# Patient Record
Sex: MALE | Race: WHITE | NOT HISPANIC OR LATINO | Employment: OTHER | URBAN - METROPOLITAN AREA
[De-identification: names, ages, dates, MRNs, and addresses within clinical notes are randomized per-mention and may not be internally consistent; named-entity substitution may affect disease eponyms.]

---

## 2024-08-05 PROCEDURE — 99284 EMERGENCY DEPT VISIT MOD MDM: CPT

## 2024-08-06 ENCOUNTER — HOSPITAL ENCOUNTER (EMERGENCY)
Facility: HOSPITAL | Age: 49
Discharge: HOME | End: 2024-08-06
Attending: EMERGENCY MEDICINE | Admitting: EMERGENCY MEDICINE
Payer: COMMERCIAL

## 2024-08-06 ENCOUNTER — APPOINTMENT (OUTPATIENT)
Dept: CT IMAGING | Facility: HOSPITAL | Age: 49
End: 2024-08-06
Payer: COMMERCIAL

## 2024-08-06 VITALS
HEIGHT: 67 IN | HEART RATE: 94 BPM | TEMPERATURE: 97.5 F | SYSTOLIC BLOOD PRESSURE: 126 MMHG | DIASTOLIC BLOOD PRESSURE: 86 MMHG | BODY MASS INDEX: 23.39 KG/M2 | RESPIRATION RATE: 18 BRPM | WEIGHT: 149 LBS | OXYGEN SATURATION: 100 %

## 2024-08-06 DIAGNOSIS — R59.0 CERVICAL ADENOPATHY: ICD-10-CM

## 2024-08-06 DIAGNOSIS — K14.8 TONGUE LESION: ICD-10-CM

## 2024-08-06 DIAGNOSIS — M54.16 LUMBAR RADICULOPATHY: Primary | ICD-10-CM

## 2024-08-06 DIAGNOSIS — B20 HIV INFECTION, UNSPECIFIED SYMPTOM STATUS: ICD-10-CM

## 2024-08-06 PROCEDURE — 72131 CT LUMBAR SPINE W/O DYE: CPT

## 2024-08-06 RX ORDER — VALBENAZINE 80 MG/1
80 CAPSULE ORAL ONCE
COMMUNITY
End: 2024-08-13 | Stop reason: SDUPTHER

## 2024-08-06 RX ORDER — DORAVIRINE 100 MG/1
100 TABLET, FILM COATED ORAL ONCE
COMMUNITY

## 2024-08-06 RX ORDER — SULFAMETHOXAZOLE AND TRIMETHOPRIM 800; 160 MG/1; MG/1
1 TABLET ORAL ONCE
COMMUNITY

## 2024-08-06 RX ORDER — HYDROCODONE BITARTRATE AND ACETAMINOPHEN 5; 325 MG/1; MG/1
1 TABLET ORAL EVERY 6 HOURS PRN
Qty: 8 TABLET | Refills: 0 | Status: SHIPPED | OUTPATIENT
Start: 2024-08-06 | End: 2024-08-13

## 2024-08-06 RX ORDER — HYDROCODONE BITARTRATE AND ACETAMINOPHEN 5; 325 MG/1; MG/1
1 TABLET ORAL ONCE
Status: COMPLETED | OUTPATIENT
Start: 2024-08-06 | End: 2024-08-06

## 2024-08-06 RX ORDER — MELOXICAM 7.5 MG/1
7.5 TABLET ORAL DAILY
Qty: 30 TABLET | Refills: 0 | Status: SHIPPED | OUTPATIENT
Start: 2024-08-06 | End: 2024-09-05

## 2024-08-06 RX ORDER — DARUNAVIR ETHANOLATE AND COBICISTAT 800; 150 MG/1; MG/1
1 TABLET, FILM COATED ORAL DAILY
COMMUNITY

## 2024-08-06 RX ORDER — DOLUTEGRAVIR SODIUM 50 MG/1
50 TABLET, FILM COATED ORAL DAILY
COMMUNITY

## 2024-08-06 RX ADMIN — HYDROCODONE BITARTRATE AND ACETAMINOPHEN 1 TABLET: 5; 325 TABLET ORAL at 03:56

## 2024-08-06 NOTE — ED PROVIDER NOTES
EMERGENCY DEPARTMENT ENCOUNTER  Room Number:  13/13  PCP: Provider, No Known  Independent Historians: Patient      HPI:  Chief Complaint: had concerns including Neck Pain and Nausea.  Back pain    A complete HPI/ROS/PMH/PSH/SH/FH are unobtainable due to: Nothing      Context: Kareem Cruz is a 49 y.o. male with a medical history of HIV/AIDS who presents to the ED c/o acute tongue lesion and left low back pain.  He states that the tongue lesion has been present for a while now.  It started as a small bump and has now progressed to become a little bit larger.  He has had associated lymph node swelling on the left side of his neck.  He states that in 2019 he had a small lesion on his tongue removed that he believes was just HPV.  He has had some associated dysphagia with solids secondary to pain.    He also reports that he has had progressive pain in his left lower back for about 1 month now.  He states that it is difficult for him to lift his left leg secondary to pain.  He denies any bowel or bladder incontinence.  No tingling, numbness, weakness in his lower extremities.    He does have a history of HIV and remains on antiretroviral therapy.  He states his most recent CD4 count was about 160.  [facility].  He has an appointment this Thursday for blood work.      Review of prior external notes (non-ED) -and- Review of prior external test results outside of this encounter: See ED course below for summary of prior outside hospital ED visit.        PAST MEDICAL HISTORY  Active Ambulatory Problems     Diagnosis Date Noted    No Active Ambulatory Problems     Resolved Ambulatory Problems     Diagnosis Date Noted    No Resolved Ambulatory Problems     Past Medical History:   Diagnosis Date    AIDS 2004    HIV (human immunodeficiency virus infection) 1992    Whooping cough 08/2015         PAST SURGICAL HISTORY  Past Surgical History:   Procedure Laterality Date    ABDOMINAL HERNIA REPAIR  2020     ABDOMINAL WALL SURGERY Bilateral 10/30/2022    Reconstructive surgery    ANKLE SURGERY Right 09/09/2009    COLON SURGERY Left 12/24/2014    Partial    HERNIA REPAIR  11/2015    HERNIA REPAIR  2016    HERNIA REPAIR  2017    HERNIA REPAIR  2019    HERNIA REPAIR Bilateral 10/30/2022    PANNICULECTOMY Bilateral 10/30/2022         FAMILY HISTORY  History reviewed. No pertinent family history.      SOCIAL HISTORY  Social History     Socioeconomic History    Marital status:    Tobacco Use    Smoking status: Former     Types: Cigarettes    Smokeless tobacco: Never   Vaping Use    Vaping status: Never Used   Substance and Sexual Activity    Alcohol use: Not Currently    Drug use: Not Currently    Sexual activity: Yes     Partners: Male         ALLERGIES  Gabapentin and Penicillins      REVIEW OF SYSTEMS  Review of all 14 systems is negative other than stated in the HPI above.      PHYSICAL EXAM    I have reviewed the triage vital signs and nursing notes.    ED Triage Vitals   Temp Heart Rate Resp BP SpO2   08/05/24 2324 08/05/24 2324 08/05/24 2324 08/05/24 2330 08/05/24 2324   (S) 97.5 °F (36.4 °C) 99 18 131/77 99 %      Temp src Heart Rate Source Patient Position BP Location FiO2 (%)   08/05/24 2324 08/05/24 2324 -- -- --   Tympanic Monitor            GENERAL: awake and alert, well-appearing, no acute distress  HENT: Normocephalic.  There is a shallow white/yellow ulcer on the surface of the tongue approximately 5 mm in width.  The oropharynx is otherwise unremarkable.  Single left anterior cervical lymph node palpable measuring approximately 2 cm.  EYES: no scleral icterus, EOMI  CV: regular rhythm, regular rate  RESPIRATORY: normal effort  ABDOMEN: soft, nondistended, nontender throughout  MUSCULOSKELETAL: no deformity.  There is no midline L-spine tenderness.  There is mild point tenderness over the left sacroiliac joint.  Negative straight leg raise right lower extremity, positive left straight leg raise at 20  degrees.  NEURO: alert, moves all extremities, follows commands, cranial nerves II through XII grossly intact, speech clear.  Normal strength and sensation throughout bilateral lower extremities.  He does have pain with left hip flexion beyond 20 degrees.  PSYCH: calm, cooperative  SKIN: Warm, dry, normal to inspection, no rash          LAB RESULTS  No results found for this or any previous visit (from the past 24 hour(s)).    The above labs were ordered by me and independently reviewed by me.     RADIOLOGY  CT Lumbar Spine Without Contrast    Result Date: 8/6/2024  CT OF THE LUMBAR SPINE  HISTORY: Low back pain with radiation to the left hip  COMPARISON: None available.  TECHNIQUE: Axial CT imaging was obtained through the lumbar spine. Coronal and sagittal reformatted images were obtained.  FINDINGS: No acute fracture or subluxation of the lumbar spine is seen. Lumbar vertebral body alignment appears within normal limits. Vertebral disc spaces appear well-maintained. Review of soft tissues demonstrates postsurgical changes involving the stomach. There are some iliac calcifications. There are postsurgical changes involving the sigmoid  T12-L1: There is no canal stenosis or neuroforaminal narrowing. L1-L2: There is no canal stenosis or neuroforaminal narrowing. L2-L3: There is mild disc bulge, without significant canal stenosis or neuroforaminal narrowing. L3-L4: There is diffuse disc bulge. There is no significant canal stenosis, but there is at least moderate neuroforaminal narrowing on the left. L4-L5: There is diffuse disc bulge. There is mild canal stenosis. There is a least moderate neuroforaminal narrowing bilaterally, although more significant on the left. L5-S1: There is disc bulge, without canal stenosis or neuroforaminal narrowing.      Degenerative changes, as noted above.  Radiation dose reduction techniques were utilized, including automated exposure control and exposure modulation based on body size.    This report was finalized on 8/6/2024 5:19 AM by Dr. Yuliet Joel M.D on Workstation: [id number]       The above radiology studies were ordered by me.  See ED course for independent interpretations.     MEDICATIONS GIVEN IN ER  Medications   HYDROcodone-acetaminophen (NORCO) 5-325 MG per tablet 1 tablet (1 tablet Oral Given 8/6/24 0356)         ORDERS PLACED DURING THIS VISIT:  Orders Placed This Encounter   Procedures    CT Lumbar Spine Without Contrast         OUTPATIENT MEDICATION MANAGEMENT:  No current Epic-ordered facility-administered medications on file.     Current Outpatient Medications Ordered in Epic   Medication Sig Dispense Refill    Darunavir-Cobicistat (Prezcobix) 800-150 MG per tablet Take 1 tablet by mouth Daily.      dolutegravir (Tivicay) 50 MG tablet Take 1 tablet by mouth Daily.      Doravirine (Pifeltro) 100 MG tablet Take 100 mg by mouth 1 (One) Time.      HYDROcodone-acetaminophen (NORCO) 5-325 MG per tablet Take 1 tablet by mouth Every 6 (Six) Hours As Needed for Moderate Pain. 8 tablet 0    meloxicam (MOBIC) 7.5 MG tablet Take 1 tablet by mouth Daily for 30 days. 30 tablet 0    sulfamethoxazole-trimethoprim (Bactrim DS) 800-160 MG per tablet Take 1 tablet by mouth 1 (One) Time.      Valbenazine Tosylate (Ingrezza) 80 MG capsule Take 80 mg by mouth 1 (One) Time.           PROCEDURES  Procedures            PROGRESS, DATA ANALYSIS, CONSULTS, AND MEDICAL DECISION MAKING  All labs have been independently interpreted by me.  All radiology studies have been reviewed by me. All EKG's have been independently viewed and interpreted by me.  Discussion below represents my analysis of pertinent findings related to patient's condition, differential diagnosis, treatment plan and final disposition.    Differential diagnosis includes but is not limited to:  Lumbar radiculopathy  Sacroiliitis  Osteomyelitis  Spinal epidural abscess  Spinal epidural hematoma        Clinical Scores:                   ED Course as of 08/06/24 0709   Tue Aug 06, 2024   0415 I reviewed outside hospital ED visit from 1/26/2024 patient was complaining of left bicep pain. [JR]   0538 CT lumbar spine independently interpreted in PACS.  This is remarkable for diffuse disc bulge at L3-4 and L4-5 with at least mild to moderate neuroforaminal narrowing on the left.  This is potentially contributing to patient's current symptoms. [JR]   0538 Patient had concerns regarding a lesion on his tongue as well as some left cervical adenopathy, in addition to his acute lower back pain.  I explained that in regards to the tongue lesion, he will need to follow-up as outpatient with ENT or surgery for consideration of biopsy.  I offered to obtain basic labs as he was concerned about potential for lymphoma.  He then stated that he has labs scheduled with his infectious disease doctor in 2 days and he would prefer to wait until then to get labs.  I think this is reasonable.  The CT L-spine does suggest likely lumbar radiculopathy contributing to his left lower back pain with radiation to the left hip region.  He does not have any neurologic deficit to warrant emergent MRI.  I do not think that he is a good candidate for steroids given his chronic immunocompromise.  I will provide him with a short course of narcotic analgesics, lidocaine patches, anti-inflammatories.  He will be referred to neurosurgery spine for further evaluation possible referral for lumbar epidural. [JR]   0559 Patient reassessed and informed of imaging findings, plan for trial of meloxicam as well as a 2-day course of hydrocodone for pain.  He has tolerated meloxicam well previously on his current ART therapy.  He was advised to follow-up with ENT or surgery regarding his tongue lesion and cervical adenopathy, as well as his infectious disease provider. [JR]      ED Course User Index  [JR] Rolando Mitchell MD             AS OF 07:09 EDT VITALS:    BP - 126/86  HR -  94  TEMP - (S) 97.5 °F (36.4 °C) (Tympanic)  O2 SATS - 100%    COMPLEXITY OF CARE        Chronic or social conditions impacting patient care (Social Determinants of Health):     DIAGNOSIS  Final diagnoses:   Lumbar radiculopathy   Tongue lesion   Cervical adenopathy   HIV infection, unspecified symptom status           DISPOSITION  DISCHARGE    Patient discharged in stable condition.    Reviewed implications of results, diagnosis, meds, responsibility to follow up, warning signs and symptoms of possible worsening, potential complications and reasons to return to ER.    Patient/Family voiced understanding of above instructions.    Discussed plan for discharge, as there is no emergent indication for admission. Patient referred to primary care provider for BP management due to today's BP. Pt/family is agreeable and understands need for follow up and repeat testing.  Pt is aware that discharge does not mean that nothing is wrong but it indicates no emergency is present that requires admission and they must continue care with follow-up as given below or physician of their choice.     FOLLOW-UP  Kristian Pate MD  [address]  [address]  [place] [id number]  [phone]    Schedule an appointment as soon as possible for a visit       Joaquin Schmitt MD  [address]  [address]  [facility] [address]  [phone]    Schedule an appointment as soon as possible for a visit       Luther Sierra MD  [address]  [facility] [address]  [phone]    Schedule an appointment as soon as possible for a visit       [facility]  [address] [place] [address]  [phone]  Schedule an appointment as soon as possible for a visit            Medication List        New Prescriptions      HYDROcodone-acetaminophen 5-325 MG per tablet  Commonly known as: NORCO  Take 1 tablet by mouth Every 6 (Six) Hours As Needed for Moderate Pain.     meloxicam 7.5 MG  tablet  Commonly known as: MOBIC  Take 1 tablet by mouth Daily for 30 days.               Where to Get Your Medications        These medications were sent to [facility]/pharmacy #[id number] - [place], [place] - [address]. AT [facility] - [phone]  - [phone]   [address] [facility] [address]      Phone: [phone]   HYDROcodone-acetaminophen 5-325 MG per tablet  meloxicam 7.5 MG tablet             Prescription drug monitoring program review:           Please note that portions of this document were completed with a voice recognition program.    Note Disclaimer: At [facility], we believe that sharing information builds trust and better relationships. You are receiving this note because you recently visited [facility]. It is possible you will see health information before a provider has talked with you about it. This kind of information can be easy to misunderstand. To help you fully understand what it means for your health, we urge you to discuss this note with your provider.         Rolando Mitchell MD  08/06/24 8529

## 2024-08-06 NOTE — ED NOTES
"Pt reports he has had neck pain for one month d/t a swollen lymph node on the left side. Pt reports he also has lower back pain that also started 1 month ago that radiates to his left hip. Pt states he has \"had HPV and had a spot removed from the center of his tongue 12/9/2019, and that there is now a grey thing sticking out of it.\" Pt also states he has abdominal pain on the left side near his navel. Pt c/o intermittent nausea x 5 days.  "

## 2024-08-13 ENCOUNTER — OFFICE VISIT (OUTPATIENT)
Dept: FAMILY MEDICINE CLINIC | Facility: CLINIC | Age: 49
End: 2024-08-13
Payer: COMMERCIAL

## 2024-08-13 VITALS
HEIGHT: 67 IN | OXYGEN SATURATION: 99 % | HEART RATE: 94 BPM | SYSTOLIC BLOOD PRESSURE: 114 MMHG | BODY MASS INDEX: 25.74 KG/M2 | WEIGHT: 164 LBS | DIASTOLIC BLOOD PRESSURE: 76 MMHG

## 2024-08-13 DIAGNOSIS — D64.9 ANEMIA, UNSPECIFIED TYPE: ICD-10-CM

## 2024-08-13 DIAGNOSIS — G24.01 TARDIVE DYSKINESIA: ICD-10-CM

## 2024-08-13 DIAGNOSIS — Z12.5 SCREENING FOR MALIGNANT NEOPLASM OF PROSTATE: ICD-10-CM

## 2024-08-13 DIAGNOSIS — E29.1 HYPOGONADISM IN MALE: ICD-10-CM

## 2024-08-13 DIAGNOSIS — Z12.11 SCREEN FOR COLON CANCER: ICD-10-CM

## 2024-08-13 DIAGNOSIS — L57.0 ACTINIC KERATOSIS: ICD-10-CM

## 2024-08-13 DIAGNOSIS — Z87.891 HISTORY OF TOBACCO USE: ICD-10-CM

## 2024-08-13 DIAGNOSIS — K63.5 HYPERPLASTIC COLONIC POLYP, UNSPECIFIED PART OF COLON: ICD-10-CM

## 2024-08-13 DIAGNOSIS — A53.9 SYPHILIS: Primary | ICD-10-CM

## 2024-08-13 DIAGNOSIS — Z00.00 ANNUAL PHYSICAL EXAM: ICD-10-CM

## 2024-08-13 DIAGNOSIS — M05.80 POLYARTHRITIS WITH POSITIVE RHEUMATOID FACTOR: ICD-10-CM

## 2024-08-13 DIAGNOSIS — Z88.0 PENICILLIN ALLERGY: ICD-10-CM

## 2024-08-13 PROBLEM — F10.11 HISTORY OF ALCOHOL ABUSE: Status: ACTIVE | Noted: 2024-08-13

## 2024-08-13 PROBLEM — I73.00 RAYNAUD'S PHENOMENON: Status: ACTIVE | Noted: 2024-08-13

## 2024-08-13 PROBLEM — F43.10 PTSD (POST-TRAUMATIC STRESS DISORDER): Status: ACTIVE | Noted: 2024-08-13

## 2024-08-13 PROBLEM — F41.9 ANXIETY AND DEPRESSION: Status: ACTIVE | Noted: 2024-08-13

## 2024-08-13 PROBLEM — M19.90 ARTHRITIS: Status: ACTIVE | Noted: 2024-08-13

## 2024-08-13 PROBLEM — Z21 HUMAN IMMUNODEFICIENCY VIRUS INFECTION: Status: ACTIVE | Noted: 2023-09-15

## 2024-08-13 PROBLEM — F19.11 HISTORY OF SUBSTANCE ABUSE: Status: ACTIVE | Noted: 2024-08-13

## 2024-08-13 PROBLEM — H91.93 BILATERAL HEARING LOSS: Status: ACTIVE | Noted: 2024-08-13

## 2024-08-13 PROBLEM — F32.A ANXIETY AND DEPRESSION: Status: ACTIVE | Noted: 2024-08-13

## 2024-08-13 PROBLEM — B20 HUMAN IMMUNODEFICIENCY VIRUS INFECTION: Status: ACTIVE | Noted: 2023-09-15

## 2024-08-13 PROCEDURE — 99386 PREV VISIT NEW AGE 40-64: CPT | Performed by: FAMILY MEDICINE

## 2024-08-13 PROCEDURE — 99215 OFFICE O/P EST HI 40 MIN: CPT | Performed by: FAMILY MEDICINE

## 2024-08-13 RX ORDER — DOXYCYCLINE HYCLATE 100 MG/1
100 CAPSULE ORAL 2 TIMES DAILY
Qty: 28 CAPSULE | Refills: 0 | Status: SHIPPED | OUTPATIENT
Start: 2024-08-13 | End: 2024-08-27

## 2024-08-13 RX ORDER — TESTOSTERONE 16.2 MG/G
1 GEL TRANSDERMAL DAILY
Qty: 75 G | Refills: 1 | Status: SHIPPED | OUTPATIENT
Start: 2024-08-13

## 2024-08-13 RX ORDER — ZINC SULFATE 50(220)MG
CAPSULE ORAL DAILY
COMMUNITY

## 2024-08-13 RX ORDER — MULTIVIT-MIN/IRON FUM/FOLIC AC 7.5 MG-4
1 TABLET ORAL DAILY
COMMUNITY

## 2024-08-13 RX ORDER — ESCITALOPRAM OXALATE 20 MG/1
20 TABLET ORAL DAILY
COMMUNITY

## 2024-08-13 RX ORDER — VALBENAZINE 80 MG/1
80 CAPSULE ORAL ONCE
Qty: 30 CAPSULE | Refills: 1 | Status: SHIPPED | OUTPATIENT
Start: 2024-08-13 | End: 2024-08-13

## 2024-08-13 NOTE — PATIENT INSTRUCTIONS
Today: Update screening labs- schedule AM lab draw    Meds: Reorder Ingrezza- sent to specialty pharmacy  Doxycyline 100mg 2x daily for 2 weeks- syphillis  Testosterone gel daily- low T    Referrals: Derm, colonoscopy, allergy, screening chest CT (lung cancer)- you should receive a call within 1 week to schedule the appointment.  If you do not hear anything please let us know.    Healthy lifestyle tips  - Reduce intake of processed food (shop perimeter of grocery store), especially white flour and sugar  - Increase intake of fruits/veggies  - Drink 32-64oz of water a day  - Quit smoking if you smoke  - Drink no more than 2 alcoholic beverages/day if you are male, no more than 1 alcoholic beverage/day if you are female  - Get 6-8 hours of restful sleep at night- poor sleep quality has been linked to increased risk of cardiovascular disease  - Voluntary physical activity cumulative 120-150 minutes/week  - Stress management utilizing meditation and mindfulness (InsightTimer, free aspen)  - Keep blood pressure < 140/90    Heart healthy diet from AHA: https://www.heart.org/en/healthy-living/healthy-eating/eat-smart/nutrition-basics/aha-diet-and-lifestyle-recommendations

## 2024-08-13 NOTE — PROGRESS NOTES
Chief Complaint  Chief Complaint   Patient presents with    Establish Care       Subjective    History of Present Illness  Kareem Cruz is a 49 y.o. male presents to [facility] to establish care.    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 He used to be morbidly obese. He managed to get off of metformin and lost 212 pounds. He follows a vegan diet. He avoids processed foods. He maintains an active lifestyle, walking his dog for at least an hour daily. He has Raynaud's phenomenon and arthritis in his back, neck, and hands, which limits his weightlifting activities. He sleeps for about 5 hours a night, which is an improvement from his previous sleep apnea due to weight loss. He has anxiety, PTSD, and depression, which occasionally disrupt his sleep. He has a prescription for trazodone, but prefers not to take it due to side effects. He is practicing good sleep hygiene.     His health goals are to lose weight. He reports experiencing concerning symptoms, including an unusual rash and enlarged lymph nodes on the back of his arms. His recent blood work, ordered at [facility], revealed positive syphilis.  He requests antibiotic treatment.  He was previously taking Xyosted testosterone every Friday, but discontinued by previous physician.  He also notes difficulties in obtaining Ingrezza for tardive dyskinesia.  He has hearing loss and uses hearing aids since 2016. He is scheduled to see an ENT specialist tomorrow.  He underwent allergy testing 2 years ago. His last colonoscopy was in 2022. He underwent colorectal surgery in 1995 and 2004, with laser surgery twice for condyloma, polyps, and squamous cell carcinoma. He is sexually active with his  and male partners, they are not in a monogamous relationship. He has received the hepatitis B vaccine series 6 times and  is a not responder. He had a positive rheumatoid factor in 2017 and MGUS in 2017. He has seen an eye doctor in the last 12 months. He has not seen a dentist in the last 12 months.  He has HIV, currently managed by the [facility] clinic with antiretroviral drugs; he takes Bactrim DS daily.    SOCIAL HISTORY  He quit smoking in 2015. He started smoking a pack a day off and on since 1992.  He has a history of alcohol abuse, he drank 7 gallons of vodka in 12/2020. He went on naltrexone therapy and has been sober since. He has a history of substance use. His last use was in 1995.    FAMILY HISTORY  His oldest brother has Meniere's syndrome.  He has a strong family history of alcohol, cigarette, and drug use.  There is family history of liver disease, heart disease, stroke.    ALLERGIES  He developed an allergy to penicillin when he was 1 or 1, he has not been exposed to it since then.  He has not been tested for penicillin allergy.    Current Outpatient Medications on File Prior to Visit   Medication Sig Dispense Refill    Darunavir-Cobicistat (Prezcobix) 800-150 MG per tablet Take 1 tablet by mouth Daily.      dolutegravir (Tivicay) 50 MG tablet Take 1 tablet by mouth Daily.      Doravirine (Pifeltro) 100 MG tablet Take 100 mg by mouth 1 (One) Time.      escitalopram (LEXAPRO) 20 MG tablet Take 1 tablet by mouth Daily.      meloxicam (MOBIC) 7.5 MG tablet Take 1 tablet by mouth Daily for 30 days. 30 tablet 0    multivitamin with minerals tablet tablet Take 1 tablet by mouth Daily.      sulfamethoxazole-trimethoprim (Bactrim DS) 800-160 MG per tablet Take 1 tablet by mouth 1 (One) Time.      zinc sulfate (ZINCATE) 220 (50 Zn) MG capsule Take  by mouth Daily.      [DISCONTINUED] HYDROcodone-acetaminophen (NORCO) 5-325 MG per tablet Take 1 tablet by mouth Every 6 (Six) Hours As Needed for Moderate Pain. (Patient not taking: Reported on 8/13/2024) 8 tablet 0    [DISCONTINUED] Valbenazine Tosylate (Ingrezza) 80 MG capsule Take 80  mg by mouth 1 (One) Time. (Patient not taking: Reported on 8/13/2024)       No current facility-administered medications on file prior to visit.       Patient Active Problem List   Diagnosis    Human immunodeficiency virus infection    Tardive dyskinesia    Raynaud's phenomenon    Arthritis    Bilateral hearing loss    Hyperplastic colonic polyp    History of alcohol abuse    History of substance abuse    Polyarthritis with positive rheumatoid factor    Penicillin allergy    Anxiety and depression    PTSD (post-traumatic stress disorder)    Syphilis    Hypogonadism in male    Actinic keratosis    History of tobacco use       Past Medical History:   Diagnosis Date    AIDS 2004    Anxiety 1988    Escitalopram    Arthritis 2004    Depression 1987    Diverticulosis 2013    Colon resection    Erectile dysfunction 2004    HIV (human immunodeficiency virus infection) 1992    HL (hearing loss) 2001    Low back pain 2021    Obesity 1991    Substance abuse 2019    Naltrexone (alcohol)    Visual impairment Prism    Head inhjury in Jan 2921    Whooping cough 08/2015       Past Surgical History:   Procedure Laterality Date    ABDOMINAL HERNIA REPAIR  2020    ABDOMINAL WALL SURGERY Bilateral 10/30/2022    Reconstructive surgery    ANKLE SURGERY Right 09/09/2009    BARIATRIC SURGERY  11/2009    COLON SURGERY Left 12/24/2014    Partial    COLONOSCOPY  11/2022    HERNIA REPAIR  11/2015    HERNIA REPAIR  2016    HERNIA REPAIR  2017    HERNIA REPAIR  2019    HERNIA REPAIR Bilateral 10/30/2022    PANNICULECTOMY Bilateral 10/30/2022    SEPTOPLASTY  06/2016    SINUS SURGERY  06/2016    SPINE SURGERY  3/2020    Rhizotomy       Family History   Problem Relation Age of Onset    Alcohol abuse Mother     Anxiety disorder Mother     Arthritis Mother     COPD Mother     Heart disease Mother     Hyperlipidemia Mother     Stroke Mother     Thyroid disease Mother     Alcohol abuse Father     Arthritis Father     Heart disease Father     Stroke  Father     Birth defects Sister         Everyone in my generation has some Sort of birth defect    Depression Sister     Depression Brother     Drug abuse Brother     Hearing loss Brother     Mental illness Brother     Mental illness Brother     Vision loss Paternal Uncle         Usher syndrome       Health Maintenance Summary            Ordered - COLORECTAL CANCER SCREENING (View Topic Details) Ordered on 8/13/2024      No completion, postpone, or frequency change history exists for this topic.              Postponed - COVID-19 Vaccine (2 - Pfizer risk series) Postponed until 11/2/2024 08/13/2024  Postponed until 11/2/2024 by Meenu Reed MD (Product Unavailable)    09/27/2021  Imm Admin: COVID-19 (PFIZER) Purple Cap Monovalent              Postponed - INFLUENZA VACCINE (Yearly - August to March) Postponed until 3/31/2025      08/13/2024  Postponed until 3/31/2025 by Meenu Reed MD (Product Unavailable)    09/20/2023  Imm Admin: Fluzone High-Dose 65+yrs    09/27/2021  Imm Admin: Influenza Seasonal Injectable    08/07/2015  Imm Admin: Influenza Seasonal Injectable              Postponed - Hepatitis B (1 of 3 - 19+ 3-dose series) Postponed until 12/31/2025 08/13/2024  Postponed until 12/31/2025 by Meenu Reed MD (Patient Refused - recieved previously- nonresponder)              TDAP/TD VACCINES (2 - Td or Tdap) Next due on 2/24/2025 02/24/2015  Imm Admin: Tdap              ANNUAL PHYSICAL (Yearly) Next due on 8/13/2025 08/13/2024  Done              BMI FOLLOWUP (Yearly) Next due on 8/13/2025 08/13/2024  SmartData: BMI EDUCATION FOR OVERWEIGHT              Pneumococcal Vaccine 0-64 (3 of 3 - PPSV23 or PCV20) Next due on 10/16/2025      10/16/2020  Imm Admin: Pneumococcal Polysaccharide (PPSV23)    02/27/2013  Imm Admin: Pneumococcal Conjugate 13-Valent (PCV13)              HEPATITIS C SCREENING  Completed      09/15/2023  Patient-Reported (Performed Externally)              ZOSTER  "VACCINE (Series Information) Completed      12/05/2023  Imm Admin: Shingrix    09/20/2023  Imm Admin: Dominik                       Objective   Vitals:    08/13/24 1309   BP: 114/76   Pulse: 94   SpO2: 99%   Weight: 74.4 kg (164 lb)   Height: 170.2 cm (67.01\")        Physical Exam  Vitals reviewed.   Constitutional:       General: He is not in acute distress.     Appearance: Normal appearance.   HENT:      Head: Normocephalic and atraumatic.      Right Ear: Tympanic membrane, ear canal and external ear normal.      Left Ear: Tympanic membrane, ear canal and external ear normal.      Nose: Nose normal. No rhinorrhea.      Mouth/Throat:      Mouth: Mucous membranes are moist.      Pharynx: No posterior oropharyngeal erythema.   Eyes:      Extraocular Movements: Extraocular movements intact.      Conjunctiva/sclera: Conjunctivae normal.      Pupils: Pupils are equal, round, and reactive to light.   Neck:      Comments: No thyromegaly or thyroid nodule on palpation  Cardiovascular:      Rate and Rhythm: Normal rate and regular rhythm.      Pulses: Normal pulses.      Heart sounds: Normal heart sounds. No murmur heard.  Pulmonary:      Effort: Pulmonary effort is normal.      Breath sounds: Normal breath sounds. No wheezing.   Abdominal:      General: Bowel sounds are normal. There is no distension.      Palpations: Abdomen is soft.      Tenderness: There is no abdominal tenderness.   Musculoskeletal:      Cervical back: Normal range of motion and neck supple.      Comments: Left wrist swelling   Lymphadenopathy:      Cervical: Cervical adenopathy (Tender anterior cervical lymphadenopathy) present.   Skin:     General: Skin is warm and dry.      Capillary Refill: Capillary refill takes less than 2 seconds.      Findings: No lesion or rash.   Neurological:      General: No focal deficit present.      Mental Status: He is alert and oriented to person, place, and time.      Gait: Gait normal.      Deep Tendon Reflexes: " Reflexes normal.   Psychiatric:         Mood and Affect: Mood normal.         Behavior: Behavior normal.         Judgment: Judgment normal.          PHQ-9 Depression Screening  Little interest or pleasure in doing things? 0-->not at all   Feeling down, depressed, or hopeless? 1-->several days   Trouble falling or staying asleep, or sleeping too much?     Feeling tired or having little energy?     Poor appetite or overeating?     Feeling bad about yourself - or that you are a failure or have let yourself or your family down?     Trouble concentrating on things, such as reading the newspaper or watching television?     Moving or speaking so slowly that other people could have noticed? Or the opposite - being so fidgety or restless that you have been moving around a lot more than usual?     Thoughts that you would be better off dead, or of hurting yourself in some way?     PHQ-9 Total Score 1   If you checked off any problems, how difficult have these problems made it for you to do your work, take care of things at home, or get along with other people?       The following data was reviewed by: Meenu Reed MD on 08/13/2024:  [facility], Care Everywhere: CBC, CMP, HIV, lipid, hep A, hep B, hep C, syphilis, gonorrhea/chlamydia  [facility] clinic note      Assessment and Plan  Kareem Cruz is a 49 y.o. male presents to [facility] today to establish care and discuss health goals/concerns, chart reviewed and updated, medications reviewed, labs reviewed, preventative med reviewed. Patient has not been seen by primary care for annual exam in the last 12 months.. Answered all questions at this time, pt expressed no further concerns today.     Preventative medicine:   Eye exam: Not up to date.  Encouraged annual optometry exam  Dental exam: Up to date.    BP: normal  Lipid Panel :  normal   A1c:  ordered    Hep C screening: Negative  Colon cancer screening UTD- age 45-75: No-referral for colonoscopy  Lung  cancer screening UTD- age 50-75: No-referral for low-dose chest CT  Immunizations UTD: No; patient previously received hep B vaccine but is not a responder and declines further vaccination  Anxiety/depression screening: normal  Tobacco cessation counseling: N/A    Men:   Aortic aneurysm screen UTD age 65: Will need  PSA UTD- age 50-75: Ordered    Assessment & Plan  1. Syphilis.  Given history of penicillin allergy, will treat with doxycycline 100 mg twice daily x 14 days.  Advised to not take at the same time as Bactrim due to medication interaction.      2.  Tardive dyskinesia  Ingrezza prescription sent to specialty pharmacy    3 hypogonadism.  A.m. testosterone pending, will refill testosterone gel.    4 penicillin allergy.  Referral to allergist for confirmatory testing    5 history of polyarthritis with positive RF  Autoimmune labs pending, unable to send to rheumatology locally without positive labs.    6 actinic keratosis  Referral to dermatology    7 history of colon polyp  Referral for colonoscopy    8.  Health maintenance.  Screening labs will be updated today.  Referrals for dermatology, colonoscopy, allergy, and screening chest CT have been made. The patient will be contacted within week to schedule these procedures. The patient is advised to maintain a healthy lifestyle, reduce white flour and sugar intake, maintain adequate hydration, abstain from smoking and drinking, and get 6 to 8 hours of restful sleep. Regular exercise and stress management are also advised. Blood pressure should be less than 140/90.    Follow-up  A follow-up visit is scheduled for 3 months from now.    Diagnoses and all orders for this visit:    1. Syphilis (Primary)  -     doxycycline (VIBRAMYCIN) 100 MG capsule; Take 1 capsule by mouth 2 (Two) Times a Day for 14 days.  Dispense: 28 capsule; Refill: 0    2. Tardive dyskinesia  -     Valbenazine Tosylate (Ingrezza) 80 MG capsule; Take 80 mg by mouth 1 (One) Time for 1 dose.  Indications: Tardive Dyskinesia  Dispense: 30 capsule; Refill: 1    3. Hypogonadism in male  -     Testosterone 20.25 MG/ACT (1.62%) gel; Place 1 g on the skin as directed by provider Daily. Indications: Deficient Activity of the Testis  Dispense: 75 g; Refill: 1  -     Testosterone (Free & Total), LC / MS; Future  -     Hemoglobin A1c; Future    4. Penicillin allergy  -     Ambulatory Referral to Allergy    5. Polyarthritis with positive rheumatoid factor  Overview:  2017    Orders:  -     WILL With / DsDNA, RNP, Sjogrens A / B, ; Future  -     WILL by IFA, Reflex to Titer and Pattern; Future  -     C-reactive Protein; Future  -     Cyclic Citrul Peptide Antibody, IgG / IgA; Future  -     Rheumatoid Factor; Future  -     Sedimentation Rate; Future    6. Actinic keratosis  -     Ambulatory Referral to Dermatology    7. Hyperplastic colonic polyp, unspecified part of colon  -     Ambulatory Referral For Screening Colonoscopy    8. Anemia, unspecified type  -     Vitamin B12; Future  -     Ferritin; Future  -     Iron; Future    9. Screen for colon cancer  -     Ambulatory Referral For Screening Colonoscopy    10. History of tobacco use  -     CT Chest Low Dose Wo; Future    11. Screening for malignant neoplasm of prostate  -     PSA Screen; Future    12. Annual physical exam        Patient voiced understanding and agreement with plan of care and had no further questions or concerns at this time.     I spent 56 minutes caring for Kareem Cruz on this date of service. This time includes time spent by me in the following activities: preparing for the visit, reviewing tests, obtaining and/or reviewing a separately obtained history, performing a medically appropriate examination and/or evaluation, counseling and educating the patient/family/caregiver, ordering medications, tests, or procedures, documenting information in the medical record, and independently interpreting results and communicating that information with  the patient/family/caregiver. I spent 15 minutes on the separately reported service of Annual physical exam. This time is not included in the time used to support the E/M service also reported today.     Problems addressed: 2 or more stable chronic illnesses (MODERATE)  1 acute illness with systemic symptoms (MODERATE)   Complexity: labs ordered yes, labs reviewed yes, records reviewied and summarized  Risk: prescription drug management    Meenu Reed MD  Family Medicine  [facility]    Follow Up  Return in about 3 months (around 11/13/2024) for Recheck.    Patient Instructions   Today: Update screening labs- schedule AM lab draw    Meds: Reorder Ingrezza- sent to specialty pharmacy  Doxycyline 100mg 2x daily for 2 weeks- syphillis  Testosterone gel daily- low T    Referrals: Derm, colonoscopy, allergy, screening chest CT (lung cancer)- you should receive a call within 1 week to schedule the appointment.  If you do not hear anything please let us know.    Healthy lifestyle tips  - Reduce intake of processed food (shop perimeter of grocery store), especially white flour and sugar  - Increase intake of fruits/veggies  - Drink 32-64oz of water a day  - Quit smoking if you smoke  - Drink no more than 2 alcoholic beverages/day if you are male, no more than 1 alcoholic beverage/day if you are female  - Get 6-8 hours of restful sleep at night- poor sleep quality has been linked to increased risk of cardiovascular disease  - Voluntary physical activity cumulative 120-150 minutes/week  - Stress management utilizing meditation and mindfulness (InsightTimer, free aspen)  - Keep blood pressure < 140/90    Heart healthy diet from AHA: https://www.heart.org/en/healthy-living/healthy-eating/eat-smart/nutrition-basics/aha-diet-and-lifestyle-recommendations       Patient or patient representative verbalized consent for the use of Ambient Listening during the visit with  Meenu Reed MD for chart documentation.  8/13/2024  15:36 EDT

## 2024-08-15 ENCOUNTER — PATIENT ROUNDING (BHMG ONLY) (OUTPATIENT)
Dept: FAMILY MEDICINE CLINIC | Facility: CLINIC | Age: 49
End: 2024-08-15
Payer: COMMERCIAL

## 2024-08-15 NOTE — PROGRESS NOTES
Good morning,    My name is Danii, I am a medical assistant here at Dr. Reed's office. We hope your recent visit with us went smoothly.     If you have any questions or concerns, please feel free to reach out at [phone]. Thank you and have a great day!     SHEREE Foy

## 2024-08-16 ENCOUNTER — OFFICE VISIT (OUTPATIENT)
Dept: FAMILY MEDICINE CLINIC | Facility: CLINIC | Age: 49
End: 2024-08-16
Payer: COMMERCIAL

## 2024-08-16 VITALS
OXYGEN SATURATION: 99 % | HEIGHT: 67 IN | HEART RATE: 94 BPM | SYSTOLIC BLOOD PRESSURE: 121 MMHG | DIASTOLIC BLOOD PRESSURE: 82 MMHG | WEIGHT: 160.1 LBS | BODY MASS INDEX: 25.13 KG/M2 | RESPIRATION RATE: 16 BRPM

## 2024-08-16 DIAGNOSIS — M99.08 SOMATIC DYSFUNCTION OF RIB: ICD-10-CM

## 2024-08-16 DIAGNOSIS — R07.89 OTHER CHEST PAIN: Primary | ICD-10-CM

## 2024-08-16 PROCEDURE — 99214 OFFICE O/P EST MOD 30 MIN: CPT | Performed by: FAMILY MEDICINE

## 2024-08-16 PROCEDURE — 93000 ELECTROCARDIOGRAM COMPLETE: CPT | Performed by: FAMILY MEDICINE

## 2024-08-16 NOTE — PROGRESS NOTES
"Chief Complaint  Chief Complaint   Patient presents with    Headache     Rib Pain, both back oif knees swollen and joint pain, hasn't ate       Subjective    History of Present Illness  Kareem Cruz is a 49 y.o. male presents to [facility] for 1 day of sharp L sided rib pain, headache, and joint pain.     History of Present Illness  He reports experiencing severe pain, akin to being struck with a baseball, but without any visible discoloration. The incident occurred at 8:30 AM when he was closing his car door while assisting a friend with an eye surgery appointment.  Pain is worse with deep breaths and palpation. He also mentions difficulty in gripping objects due to pain in his left thumb joint, both wrist, and both elbows. Additionally, he describes a mushy sensation behind his left knee. He has been taking meloxicam 7.5 mg for the pain but is uncertain about its effectiveness. He has not tried Voltaren gel.    He also reports swollen lymph nodes in the neck that are tender to touch and finds relief from hot showers. He experiences pain while swallowing due to enlarged lymph nodes and has been using Chloraseptic for relief. He has not tried lidocaine swish and spit. He has a history of thrush and has previously used viscous lidocaine, believes he may have some at home.      Objective   Vitals:    08/16/24 1408   BP: 121/82   Pulse: 94   Resp: 16   SpO2: 99%   Weight: 72.6 kg (160 lb 1.6 oz)   Height: 170.2 cm (67.01\")      Physical Exam  Vitals reviewed.   Constitutional:       Appearance: Normal appearance.   HENT:      Head: Normocephalic and atraumatic.   Cardiovascular:      Comments: Well perfused  Pulmonary:      Effort: Pulmonary effort is normal. No respiratory distress.   Chest:      Chest wall: Tenderness (Left third and fourth rib near sternal border) present.   Breasts:     Breasts are symmetrical.   Abdominal:      General: There is no distension.   Musculoskeletal:      " Cervical back: Normal range of motion and neck supple. Tenderness present.   Lymphadenopathy:      Cervical: Cervical adenopathy (Tender anterior and posterior cervical lymphadenopathy) present.   Neurological:      Mental Status: He is alert. Mental status is at baseline.            ECG 12 Lead    Date/Time: 8/16/2024 2:13 PM  Performed by: Meenu Reed MD    Authorized by: Meenu Reed MD  Comparison: not compared with previous ECG   Previous ECG: no previous ECG available  Rhythm: sinus rhythm  Rate: normal  BPM: 82  Conduction: conduction normal  ST Segments: ST segments normal  QRS axis: normal    Clinical impression: normal ECG           Assessment and Plan  Kareem Cruz is a 49 y.o. male presents to [facility] today for L sided chest pain x 1 day    Assessment & Plan  1. Costochondritis.  The symptoms are indicative of costochondritis, likely resulting from systemic inflammation due to HIV and/or syphilis infection. Meloxicam 7.5 mg was prescribed to be taken twice daily with food and water for pain management. Topical Voltaren gel was also recommended, to be applied up to four times daily, with a maximum dosage of 4 g per 24 hours. A referral to Dr. Palafox, a sports medicine specialist, was made for OMT..        Diagnoses and all orders for this visit:    1. Other chest pain (Primary)  -     ECG 12 Lead  -     Cancel: Ambulatory Referral to Sports Medicine  -     Ambulatory Referral to Sports Medicine    2. Somatic dysfunction of rib  -     Cancel: Ambulatory Referral to Sports Medicine  -     Diclofenac Sodium (VOLTAREN) 1 % gel gel; Apply 4 g topically to the appropriate area as directed 4 (Four) Times a Day As Needed (pain).  Dispense: 100 g; Refill: 1  -     Ambulatory Referral to Sports Medicine        Patient voiced understanding and agreement with plan of care and had no further questions or concerns at this time.     Problems addressed:  new presenting problem:  requiring additional assessment, consultation, or diagnostic studies  Complexity: EKG ordered yes, independent interpretation  Risk: prescription drug management    Meenu Reed MD  Family Medicine  [facility]      Follow Up  Return if symptoms worsen or fail to improve.    Patient Instructions   Today: EKG ok. Suspect costochondritis- can have rib joint dysfunction, can see DO or chiropractor. Will ref to Dr. Palafox/sports med. You should receive a call within 1 week to schedule the appointment.  If you do not hear anything please let us know.      Meds: Meloxicam 7.5mg 2x daily with food and water for pain, headache  Topical voltaren gel up to 4x daily- max 4g/24 hrs             Patient or patient representative verbalized consent for the use of Ambient Listening during the visit with  Meenu Reed MD for chart documentation. 8/16/2024  14:52 EDT

## 2024-08-16 NOTE — PATIENT INSTRUCTIONS
Today: EKG ok. Suspect costochondritis- can have rib joint dysfunction, can see DO or chiropractor. Will ref to Dr. Palafox/sports med. You should receive a call within 1 week to schedule the appointment.  If you do not hear anything please let us know.      Meds: Meloxicam 7.5mg 2x daily with food and water for pain, headache  Topical voltaren gel up to 4x daily- max 4g/24 hrs

## 2024-08-18 ENCOUNTER — APPOINTMENT (OUTPATIENT)
Dept: GENERAL RADIOLOGY | Facility: HOSPITAL | Age: 49
End: 2024-08-18
Payer: COMMERCIAL

## 2024-08-18 ENCOUNTER — APPOINTMENT (OUTPATIENT)
Dept: CT IMAGING | Facility: HOSPITAL | Age: 49
End: 2024-08-18
Payer: COMMERCIAL

## 2024-08-18 ENCOUNTER — HOSPITAL ENCOUNTER (EMERGENCY)
Facility: HOSPITAL | Age: 49
Discharge: HOME | End: 2024-08-18
Attending: EMERGENCY MEDICINE | Admitting: EMERGENCY MEDICINE
Payer: COMMERCIAL

## 2024-08-18 VITALS
WEIGHT: 150 LBS | DIASTOLIC BLOOD PRESSURE: 71 MMHG | HEART RATE: 86 BPM | BODY MASS INDEX: 23.54 KG/M2 | OXYGEN SATURATION: 100 % | RESPIRATION RATE: 18 BRPM | SYSTOLIC BLOOD PRESSURE: 116 MMHG | HEIGHT: 67 IN | TEMPERATURE: 97.9 F

## 2024-08-18 DIAGNOSIS — R07.89 LEFT-SIDED CHEST WALL PAIN: Primary | ICD-10-CM

## 2024-08-18 LAB
ALBUMIN SERPL-MCNC: 3.7 G/DL (ref 3.5–5.2)
ALBUMIN/GLOB SERPL: 1.1 G/DL
ALP SERPL-CCNC: 132 U/L (ref 39–117)
ALT SERPL W P-5'-P-CCNC: 29 U/L (ref 1–41)
ANION GAP SERPL CALCULATED.3IONS-SCNC: 8 MMOL/L (ref 5–15)
AST SERPL-CCNC: 26 U/L (ref 1–40)
BASOPHILS # BLD AUTO: 0.02 10*3/MM3 (ref 0–0.2)
BASOPHILS NFR BLD AUTO: 0.5 % (ref 0–1.5)
BILIRUB SERPL-MCNC: 0.6 MG/DL (ref 0–1.2)
BUN SERPL-MCNC: 15 MG/DL (ref 6–20)
BUN/CREAT SERPL: 14.4 (ref 7–25)
CALCIUM SPEC-SCNC: 8.8 MG/DL (ref 8.6–10.5)
CHLORIDE SERPL-SCNC: 104 MMOL/L (ref 98–107)
CO2 SERPL-SCNC: 26 MMOL/L (ref 22–29)
CREAT SERPL-MCNC: 1.04 MG/DL (ref 0.76–1.27)
DEPRECATED RDW RBC AUTO: 39.9 FL (ref 37–54)
EGFRCR SERPLBLD CKD-EPI 2021: 86.9 ML/MIN/1.73
EOSINOPHIL # BLD AUTO: 0.06 10*3/MM3 (ref 0–0.4)
EOSINOPHIL NFR BLD AUTO: 1.5 % (ref 0.3–6.2)
ERYTHROCYTE [DISTWIDTH] IN BLOOD BY AUTOMATED COUNT: 12.4 % (ref 12.3–15.4)
GLOBULIN UR ELPH-MCNC: 3.4 GM/DL
GLUCOSE SERPL-MCNC: 101 MG/DL (ref 65–99)
HCT VFR BLD AUTO: 33.7 % (ref 37.5–51)
HGB BLD-MCNC: 11.5 G/DL (ref 13–17.7)
IMM GRANULOCYTES # BLD AUTO: 0.02 10*3/MM3 (ref 0–0.05)
IMM GRANULOCYTES NFR BLD AUTO: 0.5 % (ref 0–0.5)
INR PPP: 1.12 (ref 0.9–1.1)
LYMPHOCYTES # BLD AUTO: 1.11 10*3/MM3 (ref 0.7–3.1)
LYMPHOCYTES NFR BLD AUTO: 27 % (ref 19.6–45.3)
MCH RBC QN AUTO: 30 PG (ref 26.6–33)
MCHC RBC AUTO-ENTMCNC: 34.1 G/DL (ref 31.5–35.7)
MCV RBC AUTO: 88 FL (ref 79–97)
MONOCYTES # BLD AUTO: 0.71 10*3/MM3 (ref 0.1–0.9)
MONOCYTES NFR BLD AUTO: 17.3 % (ref 5–12)
NEUTROPHILS NFR BLD AUTO: 2.19 10*3/MM3 (ref 1.7–7)
NEUTROPHILS NFR BLD AUTO: 53.2 % (ref 42.7–76)
NRBC BLD AUTO-RTO: 0 /100 WBC (ref 0–0.2)
PLATELET # BLD AUTO: 236 10*3/MM3 (ref 140–450)
PMV BLD AUTO: 8.1 FL (ref 6–12)
POTASSIUM SERPL-SCNC: 4.1 MMOL/L (ref 3.5–5.2)
PROT SERPL-MCNC: 7.1 G/DL (ref 6–8.5)
PROTHROMBIN TIME: 14.6 SECONDS (ref 11.7–14.2)
QT INTERVAL: 358 MS
QTC INTERVAL: 450 MS
RBC # BLD AUTO: 3.83 10*6/MM3 (ref 4.14–5.8)
SODIUM SERPL-SCNC: 138 MMOL/L (ref 136–145)
TROPONIN T SERPL HS-MCNC: <6 NG/L
WBC NRBC COR # BLD AUTO: 4.11 10*3/MM3 (ref 3.4–10.8)

## 2024-08-18 PROCEDURE — 93010 ELECTROCARDIOGRAM REPORT: CPT | Performed by: INTERNAL MEDICINE

## 2024-08-18 PROCEDURE — 85025 COMPLETE CBC W/AUTO DIFF WBC: CPT | Performed by: EMERGENCY MEDICINE

## 2024-08-18 PROCEDURE — 99285 EMERGENCY DEPT VISIT HI MDM: CPT

## 2024-08-18 PROCEDURE — 25510000001 IOPAMIDOL PER 1 ML: Performed by: EMERGENCY MEDICINE

## 2024-08-18 PROCEDURE — 93005 ELECTROCARDIOGRAM TRACING: CPT | Performed by: EMERGENCY MEDICINE

## 2024-08-18 PROCEDURE — 85610 PROTHROMBIN TIME: CPT | Performed by: EMERGENCY MEDICINE

## 2024-08-18 PROCEDURE — 71275 CT ANGIOGRAPHY CHEST: CPT

## 2024-08-18 PROCEDURE — 84484 ASSAY OF TROPONIN QUANT: CPT | Performed by: EMERGENCY MEDICINE

## 2024-08-18 PROCEDURE — 80053 COMPREHEN METABOLIC PANEL: CPT | Performed by: EMERGENCY MEDICINE

## 2024-08-18 PROCEDURE — 71045 X-RAY EXAM CHEST 1 VIEW: CPT

## 2024-08-18 RX ORDER — SODIUM CHLORIDE 0.9 % (FLUSH) 0.9 %
10 SYRINGE (ML) INJECTION AS NEEDED
Status: DISCONTINUED | OUTPATIENT
Start: 2024-08-18 | End: 2024-08-18 | Stop reason: HOSPADM

## 2024-08-18 RX ORDER — IOPAMIDOL 755 MG/ML
100 INJECTION, SOLUTION INTRAVASCULAR
Status: COMPLETED | OUTPATIENT
Start: 2024-08-18 | End: 2024-08-18

## 2024-08-18 RX ORDER — PREDNISONE 20 MG/1
TABLET ORAL
Qty: 10 TABLET | Refills: 0 | Status: SHIPPED | OUTPATIENT
Start: 2024-08-18

## 2024-08-18 RX ADMIN — IOPAMIDOL 76 ML: 755 INJECTION, SOLUTION INTRAVENOUS at 03:11

## 2024-08-18 NOTE — ED PROVIDER NOTES
EMERGENCY DEPARTMENT ENCOUNTER  Room Number:  14/14  PCP: Meenu Reed MD  Independent Historians: Patient      HPI:  Chief Complaint: had concerns including Pain With Breathing.     A complete HPI/ROS/PMH/PSH/SH/FH are unobtainable due to: None    Chronic or social conditions impacting patient care (Social Determinants of Health): None      Context: Kareem Cruz is a 49 y.o. male with a medical history of HIV/AIDS, diverticulosis, and low back pain who presents to the ED c/o acute left anterior chest wall pain that began 4 days ago when he was reaching out to pull something.  Since then has had progressive discomfort with movement of left upper extremity and palpation of the chest as well as deep inspirations.  No cough or hemoptysis.  Patient is on doxycycline x 4 days as he was recently diagnosed with secondary syphilis.  Patient also has persistent ongoing left low back pain that is unresolved but has been present for some time.  No new cough or hemoptysis reported.  No exertional component.      Review of prior external notes (non-ED) -and- Review of prior external test results outside of this encounter:  PCP office note 8/16/2024 reviewed: Patient complaining of left-sided chest pain, headache and joint pain diagnosed with costochondritis      PAST MEDICAL HISTORY  Active Ambulatory Problems     Diagnosis Date Noted    Human immunodeficiency virus infection 09/15/2023    Tardive dyskinesia 08/13/2024    Raynaud's phenomenon 08/13/2024    Arthritis 08/13/2024    Bilateral hearing loss 08/13/2024    Hyperplastic colonic polyp 08/13/2024    History of alcohol abuse 08/13/2024    History of substance abuse 08/13/2024    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    Penicillin allergy 08/13/2024    Anxiety and depression 08/13/2024    PTSD (post-traumatic stress disorder) 08/13/2024    Syphilis 08/13/2024    Hypogonadism in male 08/13/2024    Actinic keratosis 08/13/2024    History of tobacco use  08/13/2024     Resolved Ambulatory Problems     Diagnosis Date Noted    No Resolved Ambulatory Problems     Past Medical History:   Diagnosis Date    AIDS 2004    Anxiety 1988    Depression 1987    Diverticulosis 2013    Erectile dysfunction 2004    HIV (human immunodeficiency virus infection) 1992    HL (hearing loss) 2001    Low back pain 2021    Obesity 1991    Substance abuse 2019    Visual impairment Prism    Whooping cough 08/2015         PAST SURGICAL HISTORY  Past Surgical History:   Procedure Laterality Date    ABDOMINAL HERNIA REPAIR  2020    ABDOMINAL WALL SURGERY Bilateral 10/30/2022    Reconstructive surgery    ANKLE SURGERY Right 09/09/2009    BARIATRIC SURGERY  11/2009    COLON SURGERY Left 12/24/2014    Partial    COLONOSCOPY  11/2022    HERNIA REPAIR  11/2015    HERNIA REPAIR  2016    HERNIA REPAIR  2017    HERNIA REPAIR  2019    HERNIA REPAIR Bilateral 10/30/2022    PANNICULECTOMY Bilateral 10/30/2022    SEPTOPLASTY  06/2016    SINUS SURGERY  06/2016    SPINE SURGERY  3/2020    Rhizotomy         FAMILY HISTORY  Family History   Problem Relation Age of Onset    Alcohol abuse Mother     Anxiety disorder Mother     Arthritis Mother     COPD Mother     Heart disease Mother     Hyperlipidemia Mother     Stroke Mother     Thyroid disease Mother     Alcohol abuse Father     Arthritis Father     Heart disease Father     Stroke Father     Birth defects Sister         Everyone in my generation has some Sort of birth defect    Depression Sister     Depression Brother     Drug abuse Brother     Hearing loss Brother     Mental illness Brother     Mental illness Brother     Vision loss Paternal Uncle         Usher syndrome         SOCIAL HISTORY  Social History     Socioeconomic History    Marital status:    Tobacco Use    Smoking status: Former     Current packs/day: 0.00     Average packs/day: 1 pack/day for 23.0 years (23.0 ttl pk-yrs)     Types: Cigarettes     Start date: 1/1/1992     Quit date:  2015     Years since quittin.6    Smokeless tobacco: Never   Vaping Use    Vaping status: Never Used   Substance and Sexual Activity    Alcohol use: Not Currently    Drug use: Not Currently     Types: Amphetamines, Cocaine(coke), GHB, Ketamine, LSD, Marijuana, MDMA (ecstacy), Methamphetamines, Psilocybin    Sexual activity: Yes     Partners: Male     Birth control/protection: None         ALLERGIES  Gabapentin, Penicillins, and Adhesive tape      REVIEW OF SYSTEMS  Review of Systems  Included in HPI  All systems reviewed and negative except for those discussed in HPI.      PHYSICAL EXAM    I have reviewed the triage vital signs and nursing notes.    ED Triage Vitals [24 0103]   Temp Heart Rate Resp BP SpO2   97.9 °F (36.6 °C) 104 20 -- 100 %      Temp src Heart Rate Source Patient Position BP Location FiO2 (%)   Tympanic Monitor -- -- --       Physical Exam    Physical Exam   Constitutional: No distress.  Nontoxic but uncomfortable.  HENT:  Head: Normocephalic and atraumatic.   Oropharynx: Mucous membranes are moist.   Eyes: . No scleral icterus. No conjunctival pallor.  Neck: Normal range of motion. Neck supple.   Cardiovascular: Pink warm and well perfused throughout.  Regular  Pulmonary/Chest: No respiratory distress.  No tachypnea or increased work of breathing appreciated.  Tender left anterior chest wall with no traumatic deformity or rash.  Abdominal: Soft. There is no tenderness. There is no rebound and no guarding.  Benign  Musculoskeletal: Moves all extremities equally.    Neurological: Alert and oriented.  No acute focal deficit appreciated.  Skin: Skin is pink, warm, and dry.   Psychiatric: Mood and affect normal.   Nursing note and vitals reviewed.             LAB RESULTS  Recent Results (from the past 24 hour(s))   ECG 12 Lead Chest Pain    Collection Time: 24  1:27 AM   Result Value Ref Range    QT Interval 358 ms    QTC Interval 450 ms   Comprehensive Metabolic Panel    Collection  Time: 08/18/24  1:28 AM    Specimen: Blood   Result Value Ref Range    Glucose 101 (H) 65 - 99 mg/dL    BUN 15 6 - 20 mg/dL    Creatinine 1.04 0.76 - 1.27 mg/dL    Sodium 138 136 - 145 mmol/L    Potassium 4.1 3.5 - 5.2 mmol/L    Chloride 104 98 - 107 mmol/L    CO2 26.0 22.0 - 29.0 mmol/L    Calcium 8.8 8.6 - 10.5 mg/dL    Total Protein 7.1 6.0 - 8.5 g/dL    Albumin 3.7 3.5 - 5.2 g/dL    ALT (SGPT) 29 1 - 41 U/L    AST (SGOT) 26 1 - 40 U/L    Alkaline Phosphatase 132 (H) 39 - 117 U/L    Total Bilirubin 0.6 0.0 - 1.2 mg/dL    Globulin 3.4 gm/dL    A/G Ratio 1.1 g/dL    BUN/Creatinine Ratio 14.4 7.0 - 25.0    Anion Gap 8.0 5.0 - 15.0 mmol/L    eGFR 86.9 >60.0 mL/min/1.73   Protime-INR    Collection Time: 08/18/24  1:28 AM    Specimen: Blood   Result Value Ref Range    Protime 14.6 (H) 11.7 - 14.2 Seconds    INR 1.12 (H) 0.90 - 1.10   High Sensitivity Troponin T    Collection Time: 08/18/24  1:28 AM    Specimen: Blood   Result Value Ref Range    HS Troponin T <6 <22 ng/L   CBC Auto Differential    Collection Time: 08/18/24  1:28 AM    Specimen: Blood   Result Value Ref Range    WBC 4.11 3.40 - 10.80 10*3/mm3    RBC 3.83 (L) 4.14 - 5.80 10*6/mm3    Hemoglobin 11.5 (L) 13.0 - 17.7 g/dL    Hematocrit 33.7 (L) 37.5 - 51.0 %    MCV 88.0 79.0 - 97.0 fL    MCH 30.0 26.6 - 33.0 pg    MCHC 34.1 31.5 - 35.7 g/dL    RDW 12.4 12.3 - 15.4 %    RDW-SD 39.9 37.0 - 54.0 fl    MPV 8.1 6.0 - 12.0 fL    Platelets 236 140 - 450 10*3/mm3    Neutrophil % 53.2 42.7 - 76.0 %    Lymphocyte % 27.0 19.6 - 45.3 %    Monocyte % 17.3 (H) 5.0 - 12.0 %    Eosinophil % 1.5 0.3 - 6.2 %    Basophil % 0.5 0.0 - 1.5 %    Immature Grans % 0.5 0.0 - 0.5 %    Neutrophils, Absolute 2.19 1.70 - 7.00 10*3/mm3    Lymphocytes, Absolute 1.11 0.70 - 3.10 10*3/mm3    Monocytes, Absolute 0.71 0.10 - 0.90 10*3/mm3    Eosinophils, Absolute 0.06 0.00 - 0.40 10*3/mm3    Basophils, Absolute 0.02 0.00 - 0.20 10*3/mm3    Immature Grans, Absolute 0.02 0.00 - 0.05 10*3/mm3     nRBC 0.0 0.0 - 0.2 /100 WBC         RADIOLOGY  CT Angiogram Chest Pulmonary Embolism    Result Date: 8/18/2024  CT ANGIOGRAM OF THE CHEST  HISTORY: Chest pain  COMPARISON: None available.  TECHNIQUE: Axial CT imaging was obtained through the thorax. IV contrast was administered. Three-D reformatted images were obtained.  FINDINGS: No acute pulmonary thromboembolus is seen. Thoracic aorta is normal in caliber. There is no dissection. There are coronary artery calcifications. Thyroid gland, trachea, and esophagus appear unremarkable. Mediastinal lymph nodes do not appear pathologically enlarged. There is minimal dependent atelectasis. No acute infiltrates are seen. Images through the upper abdomen demonstrate changes of prior gastric sleeve procedure. Stool burden within the visualized colon appears increased. No acute osseous abnormalities are identified.      No acute pulmonary thromboembolus identified.  Radiation dose reduction techniques were utilized, including automated exposure control and exposure modulation based on body size.   This report was finalized on 8/18/2024 3:28 AM by Dr. Yuliet Joel M.D on Workstation: [id number]      XR Chest 1 View    Result Date: 8/18/2024  SINGLE VIEW OF THE CHEST  HISTORY: Painful respiration  COMPARISON: None available.  FINDINGS: Heart size is within normal limits. No pneumothorax, pleural effusion, or acute infiltrate is seen.      No acute findings.  This report was finalized on 8/18/2024 1:48 AM by Dr. Yuliet Joel M.D on Workstation: [id number]         MEDICATIONS GIVEN IN ER  Medications   sodium chloride 0.9 % flush 10 mL (has no administration in time range)   iopamidol (ISOVUE-370) 76 % injection 100 mL (76 mL Intravenous Given 8/18/24 0311)         ORDERS PLACED DURING THIS VISIT:  Orders Placed This Encounter   Procedures    XR Chest 1 View    CT Angiogram Chest Pulmonary Embolism    Comprehensive Metabolic Panel    Protime-INR    High Sensitivity  Troponin T    CBC Auto Differential    Monitor Blood Pressure    Continuous Pulse Oximetry    ECG 12 Lead Chest Pain    Insert Peripheral IV    CBC & Differential         OUTPATIENT MEDICATION MANAGEMENT:  Current Facility-Administered Medications Ordered in Epic   Medication Dose Route Frequency Provider Last Rate Last Admin    sodium chloride 0.9 % flush 10 mL  10 mL Intravenous PRN Rich Smith MD         Current Outpatient Medications Ordered in Epic   Medication Sig Dispense Refill    Darunavir-Cobicistat (Prezcobix) 800-150 MG per tablet Take 1 tablet by mouth Daily.      Diclofenac Sodium (VOLTAREN) 1 % gel gel Apply 4 g topically to the appropriate area as directed 4 (Four) Times a Day As Needed (pain). 100 g 1    dolutegravir (Tivicay) 50 MG tablet Take 1 tablet by mouth Daily.      Doravirine (Pifeltro) 100 MG tablet Take 100 mg by mouth 1 (One) Time.      doxycycline (VIBRAMYCIN) 100 MG capsule Take 1 capsule by mouth 2 (Two) Times a Day for 14 days. 28 capsule 0    escitalopram (LEXAPRO) 20 MG tablet Take 1 tablet by mouth Daily.      meloxicam (MOBIC) 7.5 MG tablet Take 1 tablet by mouth Daily for 30 days. 30 tablet 0    multivitamin with minerals tablet tablet Take 1 tablet by mouth Daily.      predniSONE (DELTASONE) 20 MG tablet 2 tablets by mouth daily for 5 days 10 tablet 0    sulfamethoxazole-trimethoprim (Bactrim DS) 800-160 MG per tablet Take 1 tablet by mouth 1 (One) Time.      Testosterone 20.25 MG/ACT (1.62%) gel Place 1 g on the skin as directed by provider Daily. Indications: Deficient Activity of the Testis 75 g 1    zinc sulfate (ZINCATE) 220 (50 Zn) MG capsule Take  by mouth Daily.           PROCEDURES  Procedures            PROGRESS, DATA ANALYSIS, CONSULTS, AND MEDICAL DECISION MAKING  All labs have been independently interpreted by me.  All radiology studies have been reviewed by me. All EKG's have been independently viewed and interpreted by me.  Discussion below represents my  analysis of pertinent findings related to patient's condition, differential diagnosis, treatment plan and final disposition.    Differential diagnosis:   My differential diagnosis for chest pain includes but is not limited to:  Muscle strain, costochondritis, myositis, pleurisy, rib fracture, intercostal neuritis, herpes zoster, tumor, myocardial infarction, coronary syndrome, unstable angina, angina, aortic dissection, mitral valve prolapse, pericarditis, palpitations, pulmonary embolus, pneumonia, pneumothorax, lung cancer, GERD, esophagitis, esophageal spasm      Clinical Scores:                  ED Course as of 08/18/24 0354   Sun Aug 18, 2024   0124 Patient declines offer for pain medication at this time tell me that he took 2 ibuprofen before he departed his house.  He is agreeable with plan for laboratory and radiologic evaluation. [RS]   0131 EKG           EKG time: 0127  Rhythm/Rate: Sinus, 95  P waves and TN: TRACEY within normal limits  QRS, axis: Narrow complex  ST and T waves: No STEMI; QTc within normal limits    Interpreted Contemporaneously by me, independently viewed  Comparison: Unavailable   [RS]   0212 WBC: 4.11 [RS]   0212 Hemoglobin(!): 11.5 [RS]   0212 Platelets: 236 [RS]   0212 Immature Grans, Absolute: 0.02 [RS]   0212 Glucose(!): 101 [RS]   0212 BUN: 15 [RS]   0212 Creatinine: 1.04 [RS]   0212 Sodium: 138 [RS]   0212 Potassium: 4.1 [RS]   0212 Anion Gap: 8.0 [RS]   0212 RADIOLOGY      Study: Single view chest  Findings: No pneumothorax or large focal infiltrate noted  I independently viewed and interpreted these images contemporaneously with treatment.    [RS]   0348 No evidence of acute life-threatening pathology causing the patient's left anterior chest wall pain. [RS]   0353 Reviewed all findings with the patient.  He is frustrated that I do not have a definitive diagnosis for him.  I have encouraged NSAIDs topically as his stomach does not tolerate orally.  He reports that he has a  prescription for this from his primary care provider that he has not collected yet.  We also discussed the possibility of steroids.  He is agreeable with this plan. [RS]      ED Course User Index  [RS] Rich Smith MD         Prescription drug monitoring program review:     AS OF 03:54 EDT VITALS:    BP - 116/71  HR - 86  TEMP - 97.9 °F (36.6 °C) (Tympanic)  O2 SATS - 100%    COMPLEXITY OF CARE  Admission was considered but after careful review of the patient's presentation, physical examination, diagnostic results, and response to treatment the patient may be safely discharged with outpatient follow-up.      DIAGNOSIS  Final diagnoses:   Left-sided chest wall pain         DISPOSITION  ED Disposition       ED Disposition   Discharge    Condition   Stable    Comment   --                  DISCHARGE    Patient discharged in stable condition.    Reviewed implications of results, diagnosis, meds, responsibility to follow up, warning signs and symptoms of possible worsening, potential complications and reasons to return to ER.    Patient/Family voiced understanding of above instructions.    Discussed plan for discharge, as there is no emergent indication for admission. Patient referred to primary care provider for regular health maintenance. Pt/family is agreeable and understands need for follow up and possible repeat testing.  Pt is aware that discharge does not mean that nothing is wrong but it indicates no emergency is present that requires admission and they must continue care with follow-up as given below or physician of their choice.     FOLLOW-UP  Meenu Reed MD  [address] [facility] [address] [phone]    Schedule an appointment as soon as possible for a visit in 3 days  If symptoms fail to improve         Medication List        New Prescriptions      predniSONE 20 MG tablet  Commonly known as: DELTASONE  2 tablets by mouth daily for 5 days               Where to Get Your Medications         These medications were sent to [facility]/pharmacy #[id number] - [place], [place] - [address] AT [facility] - [phone]  - [phone] fx [address] [facility] [address]      Phone: [phone]   predniSONE 20 MG tablet           Please note that portions of this document were completed with a voice recognition program.    Note Disclaimer: At [facility], we believe that sharing information builds trust and better relationships. You are receiving this note because you recently visited [facility]. It is possible you will see health information before a provider has talked with you about it. This kind of information can be easy to misunderstand. To help you fully understand what it means for your health, we urge you to discuss this note with your provider.         Rich Smith MD  08/18/24 0354

## 2024-08-19 ENCOUNTER — TELEPHONE (OUTPATIENT)
Dept: FAMILY MEDICINE CLINIC | Facility: CLINIC | Age: 49
End: 2024-08-19
Payer: COMMERCIAL

## 2024-08-19 NOTE — TELEPHONE ENCOUNTER
Pharmacy called regarding Ingrezza, is it okay to cancel it looks like it was discontinued      [phone]   [phone]

## 2024-08-20 DIAGNOSIS — G24.01 TARDIVE DYSKINESIA: Primary | ICD-10-CM

## 2024-08-21 ENCOUNTER — TELEPHONE (OUTPATIENT)
Dept: FAMILY MEDICINE CLINIC | Facility: CLINIC | Age: 49
End: 2024-08-21
Payer: COMMERCIAL

## 2024-08-22 DIAGNOSIS — G24.01 TARDIVE DYSKINESIA: Primary | ICD-10-CM

## 2024-08-22 RX ORDER — VALBENAZINE 40 MG/1
CAPSULE ORAL
Qty: 187 CAPSULE | Refills: 0 | Status: SHIPPED | OUTPATIENT
Start: 2024-08-22 | End: 2024-11-27

## 2024-08-26 DIAGNOSIS — D47.2 MGUS (MONOCLONAL GAMMOPATHY OF UNKNOWN SIGNIFICANCE): ICD-10-CM

## 2024-08-26 DIAGNOSIS — B02.22 TRIGEMINAL HERPES ZOSTER: ICD-10-CM

## 2024-08-26 DIAGNOSIS — E29.1 HYPOGONADISM IN MALE: Primary | ICD-10-CM

## 2024-08-26 RX ORDER — VALACYCLOVIR HYDROCHLORIDE 1 G/1
1000 TABLET, FILM COATED ORAL 3 TIMES DAILY
Qty: 21 TABLET | Refills: 0 | Status: SHIPPED | OUTPATIENT
Start: 2024-08-26

## 2024-08-26 RX ORDER — ONDANSETRON 4 MG/1
4 TABLET, ORALLY DISINTEGRATING ORAL EVERY 8 HOURS PRN
Qty: 30 TABLET | Refills: 1 | Status: SHIPPED | OUTPATIENT
Start: 2024-08-26

## 2024-09-17 ENCOUNTER — PATIENT MESSAGE (OUTPATIENT)
Dept: FAMILY MEDICINE CLINIC | Facility: CLINIC | Age: 49
End: 2024-09-17
Payer: COMMERCIAL

## 2024-10-01 ENCOUNTER — TELEPHONE (OUTPATIENT)
Dept: FAMILY MEDICINE CLINIC | Facility: CLINIC | Age: 49
End: 2024-10-01
Payer: COMMERCIAL

## 2024-10-01 NOTE — TELEPHONE ENCOUNTER
You ordered a LDCT on this patient back in August, but he also had a CT angiogram. Does he still need the LDCT?

## 2024-10-24 ENCOUNTER — OFFICE VISIT (OUTPATIENT)
Dept: FAMILY MEDICINE CLINIC | Facility: CLINIC | Age: 49
End: 2024-10-24
Payer: COMMERCIAL

## 2024-10-24 VITALS
HEIGHT: 67 IN | WEIGHT: 157 LBS | SYSTOLIC BLOOD PRESSURE: 108 MMHG | OXYGEN SATURATION: 99 % | BODY MASS INDEX: 24.64 KG/M2 | DIASTOLIC BLOOD PRESSURE: 75 MMHG | HEART RATE: 81 BPM

## 2024-10-24 DIAGNOSIS — R79.89 LOW VITAMIN D LEVEL: ICD-10-CM

## 2024-10-24 DIAGNOSIS — A53.9 SYPHILIS: Primary | ICD-10-CM

## 2024-10-24 DIAGNOSIS — E29.1 HYPOGONADISM IN MALE: ICD-10-CM

## 2024-10-24 DIAGNOSIS — D64.9 ANEMIA, UNSPECIFIED TYPE: ICD-10-CM

## 2024-10-24 PROCEDURE — 99214 OFFICE O/P EST MOD 30 MIN: CPT | Performed by: FAMILY MEDICINE

## 2024-10-24 PROCEDURE — 96372 THER/PROPH/DIAG INJ SC/IM: CPT | Performed by: FAMILY MEDICINE

## 2024-10-24 NOTE — PROGRESS NOTES
"Chief Complaint  Chief Complaint   Patient presents with    Rash     Muscle/joint pain, red dots on arms       Subjective    History of Present Illness  Kareem Cruz is a 49 y.o. male presents to [facility]    History of Present Illness  The patient presents for evaluation of syphilis.    He has completed a full course of doxycycline and has been tested for penicillin allergy, which came back negative.  Recent testing at the [address] [place] [address] showed persistent RPR quant of 1:4, so he is here for IM penicillin treatment. He has experienced a fever following antibiotic treatment.     He is currently in the process of obtaining medical records to get [facility] approved. He also needs to reschedule  colonoscopy and repeat testosterone test- he is under significant stress due to his 's poor choices.       Objective   Vitals:    10/24/24 1437   BP: 108/75   Pulse: 81   SpO2: 99%   Weight: 71.2 kg (157 lb)   Height: 170.2 cm (67.01\")        BMI is within normal parameters. No other follow-up for BMI required.     Physical Exam  Vitals reviewed.   Constitutional:       Appearance: Normal appearance.   HENT:      Head: Normocephalic and atraumatic.   Cardiovascular:      Comments: Well perfused  Pulmonary:      Effort: Pulmonary effort is normal. No respiratory distress.   Abdominal:      General: There is no distension.   Neurological:      Mental Status: He is alert. Mental status is at baseline.          The following data was reviewed by: Meenu Reed MD on 10/24/2024:  RPR, RPR quant, CMP  Last PCP note    Assessment and Plan  Kareem Cruz is a 49 y.o. male presents to [facility] today     Assessment & Plan  1. Syphilis.  Elevated syphilis titers were noted, while liver and kidney functions were within normal limits. Bicillin LA was prescribed. He was advised to seek immediate medical attention at the hospital if he experiences symptoms such as meningitis, " respiratory distress, kidney or liver failure, mental status changes, stroke, or seizures. Nonsteroidal anti-inflammatory drugs were recommended for mild symptoms.     2. Penicillin Allergy.  The patient was tested for penicillin allergy and confirmed to be non-allergic. This information was updated in her chart.    3. Health Maintenance.  He needs to reschedule her colonoscopy. He will be coming in tomorrow for fasting labs.     Follow-up  Return in 1 month for follow up.      Diagnoses and all orders for this visit:    1. Syphilis (Primary)  -     Penicillin G Benzathine (BICILLIN-LA) injection 1.2 Million Units    2. Hypogonadism in male  -     Testosterone (Free & Total), LC / MS; Future  -     Lipid Panel; Future    3. Anemia, unspecified type  -     Vitamin B12; Future  -     Ferritin; Future  -     CBC & Differential; Future    4. Low vitamin D level  -     Vitamin D,25-Hydroxy; Future        Patient voiced understanding and agreement with plan of care and had no further questions or concerns at this time.     Problems addressed: 2 or more stable chronic illnesses (MODERATE) established problem: inadequately controlled,  Complexity: labs ordered yes, labs reviewed yes  Risk: prescription drug management    Meenu Reed MD  Family Medicine  [facility]      Follow Up  Return for Next scheduled follow up.    Patient Instructions   Penicillin injection today     Patient or patient representative verbalized consent for the use of Ambient Listening during the visit with  Meenu Reed MD for chart documentation. 10/24/2024  16:06 EDT

## 2024-11-11 ENCOUNTER — PATIENT MESSAGE (OUTPATIENT)
Dept: FAMILY MEDICINE CLINIC | Facility: CLINIC | Age: 49
End: 2024-11-11
Payer: COMMERCIAL

## 2024-12-04 ENCOUNTER — APPOINTMENT (OUTPATIENT)
Dept: GENERAL RADIOLOGY | Facility: HOSPITAL | Age: 49
End: 2024-12-04
Payer: COMMERCIAL

## 2024-12-04 ENCOUNTER — HOSPITAL ENCOUNTER (EMERGENCY)
Facility: HOSPITAL | Age: 49
Discharge: HOME | End: 2024-12-04
Attending: EMERGENCY MEDICINE | Admitting: EMERGENCY MEDICINE
Payer: COMMERCIAL

## 2024-12-04 VITALS
WEIGHT: 150 LBS | DIASTOLIC BLOOD PRESSURE: 88 MMHG | OXYGEN SATURATION: 100 % | TEMPERATURE: 98.3 F | BODY MASS INDEX: 23.54 KG/M2 | HEIGHT: 67 IN | HEART RATE: 96 BPM | SYSTOLIC BLOOD PRESSURE: 127 MMHG | RESPIRATION RATE: 16 BRPM
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
## 2024-12-04 DIAGNOSIS — S62.654A CLOSED NONDISPLACED FRACTURE OF MIDDLE PHALANX OF RIGHT RING FINGER, INITIAL ENCOUNTER: ICD-10-CM

## 2024-12-04 PROCEDURE — 99283 EMERGENCY DEPT VISIT LOW MDM: CPT | Performed by: EMERGENCY MEDICINE

## 2024-12-04 PROCEDURE — 99283 EMERGENCY DEPT VISIT LOW MDM: CPT

## 2024-12-04 PROCEDURE — 90471 IMMUNIZATION ADMIN: CPT | Performed by: EMERGENCY MEDICINE

## 2024-12-04 PROCEDURE — 25010000002 TETANUS-DIPHTH-ACELL PERTUSSIS 5-2.5-18.5 LF-MCG/0.5 SUSPENSION PREFILLED SYRINGE: Performed by: EMERGENCY MEDICINE

## 2024-12-04 PROCEDURE — 73090 X-RAY EXAM OF FOREARM: CPT

## 2024-12-04 PROCEDURE — 73130 X-RAY EXAM OF HAND: CPT

## 2024-12-04 PROCEDURE — 90715 TDAP VACCINE 7 YRS/> IM: CPT | Performed by: EMERGENCY MEDICINE

## 2024-12-04 RX ORDER — ACETAMINOPHEN 500 MG
1000 TABLET ORAL ONCE
Status: COMPLETED | OUTPATIENT
Start: 2024-12-04 | End: 2024-12-04

## 2024-12-04 RX ORDER — ACETAMINOPHEN 500 MG
1000 TABLET ORAL EVERY 6 HOURS PRN
Qty: 20 TABLET | Refills: 0 | Status: SHIPPED | OUTPATIENT
Start: 2024-12-04

## 2024-12-04 RX ADMIN — TETANUS TOXOID, REDUCED DIPHTHERIA TOXOID AND ACELLULAR PERTUSSIS VACCINE, ADSORBED 0.5 ML: 5; 2.5; 8; 8; 2.5 SUSPENSION INTRAMUSCULAR at 12:22

## 2024-12-04 RX ADMIN — ACETAMINOPHEN 1000 MG: 500 TABLET ORAL at 12:21

## 2024-12-04 NOTE — ED PROVIDER NOTES
Subjective   History of Present Illness  Patient presents complaining of dog bite to right hand and forearm.  This occurred just prior to arrival.  Dog is known and shots up-to-date.  Patient is a  and had been taking care of the dog but the dog is very protective and when the patient walked into the house to be with the dog the dog attacked.  Patient's last tetanus shot was in 2015.  Patient is complaining of severe pain at the fourth finger and at the base of his thumb on the right hand.  Patient is also having diffuse pain to the hand, wrist, and forearm.  Patient reports decreased sensation to the distal portion of the fourth finger.  Patient is able to move his fingers but says it is extremely painful so is limited.  No therapy taken prior to arrival.  Nothing seems to make it better.  Range of motion makes everything worse.      Review of Systems   All other systems reviewed and are negative.      Past Medical History:   Diagnosis Date    AIDS 2004    Anxiety 1988    Escitalopram    Arthritis 2004    Depression 1987    Diverticulosis 2013    Colon resection    Erectile dysfunction 2004    HIV (human immunodeficiency virus infection) 1992    HL (hearing loss) 2001    Low back pain 2021    Obesity 1991    Substance abuse 2019    Naltrexone (alcohol)    Syphilis     Visual impairment Prism    Head inhjury in Jan 2921    Whooping cough 08/2015       Allergies   Allergen Reactions    Gabapentin Mental Status Change and Hallucinations    Adhesive Tape Dermatitis       Past Surgical History:   Procedure Laterality Date    ABDOMINAL HERNIA REPAIR  2020    ABDOMINAL WALL SURGERY Bilateral 10/30/2022    Reconstructive surgery    ANKLE SURGERY Right 09/09/2009    BARIATRIC SURGERY  11/2009    COLON SURGERY Left 12/24/2014    Partial    COLONOSCOPY  11/2022    HERNIA REPAIR  11/2015    HERNIA REPAIR  2016    HERNIA REPAIR  2017    HERNIA REPAIR  2019    HERNIA REPAIR Bilateral 10/30/2022    PANNICULECTOMY  Bilateral 10/30/2022    SEPTOPLASTY  2016    SINUS SURGERY  2016    SPINE SURGERY  3/2020    Rhizotomy       Family History   Problem Relation Age of Onset    Alcohol abuse Mother     Anxiety disorder Mother     Arthritis Mother     COPD Mother     Heart disease Mother     Hyperlipidemia Mother     Stroke Mother     Thyroid disease Mother     Alcohol abuse Father     Arthritis Father     Heart disease Father     Stroke Father     Birth defects Sister         Everyone in my generation has some Sort of birth defect    Depression Sister     Depression Brother     Drug abuse Brother     Hearing loss Brother     Mental illness Brother     Mental illness Brother     Vision loss Paternal Uncle         Usher syndrome       Social History     Socioeconomic History    Marital status:    Tobacco Use    Smoking status: Former     Current packs/day: 0.00     Average packs/day: 1 pack/day for 23.0 years (23.0 ttl pk-yrs)     Types: Cigarettes     Start date: 1992     Quit date: 2015     Years since quittin.9    Smokeless tobacco: Never   Vaping Use    Vaping status: Never Used   Substance and Sexual Activity    Alcohol use: Not Currently    Drug use: Not Currently     Types: Amphetamines, Cocaine(coke), GHB, Ketamine, LSD, Marijuana, MDMA (ecstacy), Methamphetamines, Psilocybin    Sexual activity: Yes     Partners: Male     Birth control/protection: None           Objective   Physical Exam  Vitals and nursing note reviewed.   HENT:      Head: Normocephalic.      Right Ear: External ear normal.      Nose: Nose normal.      Mouth/Throat:      Pharynx: Oropharynx is clear.   Eyes:      Conjunctiva/sclera: Conjunctivae normal.   Cardiovascular:      Pulses:           Radial pulses are 2+ on the right side.   Pulmonary:      Effort: Pulmonary effort is normal.   Musculoskeletal:        Arms:         Hands:       Cervical back: Neck supple.      Comments: Multiple superficial puncture wounds on right hand and  over third fourth and fifth fingers.  Patient also has contusion and tenderness at the base of the right thumb on the palmar surface.  Patient also has a contusion and abrasion to the proximal lateral posterior forearm with a 1 cm circular area of superficial abrasion.  No obvious deformity.  Patient has limited range of motion at the fingers.  Patient is able to flex and extend at the wrist, elbow, and shoulder on the right without difficulty.  Patient has decreased sensation to the distal fourth finger.  Patient does have the ability to flex and extend fully with first and second digits but is limited with range of motion of the third, fourth, and fifth secondary to pain.   Skin:     General: Skin is warm and dry.      Capillary Refill: Capillary refill takes 2 to 3 seconds.   Neurological:      Mental Status: He is alert.      Sensory: Sensory deficit present.      Motor: No weakness.   Psychiatric:         Mood and Affect: Mood normal.         Behavior: Behavior normal.         Procedures           ED Course                                                       Medical Decision Making  Ddx fracture, dislocation, sprain, strain, nerve injury, vascular injury, tendon injury, ligamentous injury, retained foreign body, infection    XR Hand 3+ View Right    Result Date: 12/4/2024  Impression: 1. Nondisplaced middle fourth phalanx fracture with intra-articular extension into the proximal interphalangeal joint. 2. Minimally displaced fracture involving base of distal fourth phalanx without convincing intra-articular extension. 3. No acute osseous abnormality involving the forearm. 4. Negative for radiodense foreign bodies. Electronically Signed: Rolando Ferro MD  12/4/2024 12:32 PM EST  Workstation ID: [id number]    XR Forearm 2 View Right    Result Date: 12/4/2024  Impression: 1. Nondisplaced middle fourth phalanx fracture with intra-articular extension into the proximal interphalangeal joint. 2. Minimally displaced  fracture involving base of distal fourth phalanx without convincing intra-articular extension. 3. No acute osseous abnormality involving the forearm. 4. Negative for radiodense foreign bodies. Electronically Signed: Rolando Ferro MD  12/4/2024 12:32 PM EST    Workstation ID: [id number]    1310 aluminum foam finger splint applied to the right hand fourth digit by tech, supervised by myself, neurovascular intact status post splint application.    1310 Pt seen again prior to d/c.  Imaging reviewed and are remarkable for 4th finger injury/fracture, no f.b.  Patient given pain pill.  Symptoms improved and pt feels better, vitals stable and pt. in NAD. Non-toxic. Comfortable. Ambulating without difficulty.  Tolerating po.  Relaxed breathing.  Patient will be started on antibiotics and advised to follow-up with hand due to the fracture.  Patient advised to keep the hand somewhat dry and no submersion in liquid.  Patient advised to follow-up should he develop fever, streaking redness up his arm or drainage from the wounds.  All questions personally answered at the bedside and all d/c instructions personally reviewed with pt.  Discussed the importance of close outpt. f/u and pt. understands this and agrees to do so.  Pt agrees to return to ED immediately for any new, persistent, or worsening symptoms.    EMR Dragon/Transcription disclaimer:  Much of this encounter note is an electronic transcription/translation of spoken language to printed text, aka voice recognition.  The electronic translation of spoken language may permit erroneous or at times nonsensical words or phrases to be inadvertently transcribed; although I have reviewed the note for such errors, some may still exist so please interpret based on surrounding text content.      Problems Addressed:  Closed nondisplaced fracture of middle phalanx of right ring finger, initial encounter: complicated acute illness or injury  Dog bite, initial encounter: complicated  acute illness or injury    Amount and/or Complexity of Data Reviewed  Radiology: ordered.    Risk  OTC drugs.  Prescription drug management.        Final diagnoses:   Dog bite, initial encounter   Closed nondisplaced fracture of middle phalanx of right ring finger, initial encounter       ED Disposition  ED Disposition       ED Disposition   Discharge    Condition   Stable    Comment   --               Meenu Reed MD  [address] [facility] [id number] [phone]    In 3 days  For wound re-check    [facility]  [address] [facility] [address]  [facility] [address]  [phone]  In 1 week           Medication List        New Prescriptions      acetaminophen 500 MG tablet  Commonly known as: TYLENOL  Take 2 tablets by mouth Every 6 (Six) Hours As Needed for Mild Pain or Moderate Pain.     amoxicillin-clavulanate 875-125 MG per tablet  Commonly known as: AUGMENTIN  Take 1 tablet by mouth Every 12 (Twelve) Hours for 7 days.               Where to Get Your Medications        These medications were sent to [facility] [id number] - [place], [place] - [address] [facility] [phone] [facility] [phone]   [address] [facility] [address]      Phone: [phone]   acetaminophen 500 MG tablet  amoxicillin-clavulanate 875-125 MG per tablet            Titus Gleason MD  12/04/24 1060

## 2024-12-10 ENCOUNTER — OFFICE VISIT (OUTPATIENT)
Dept: FAMILY MEDICINE CLINIC | Facility: CLINIC | Age: 49
End: 2024-12-10
Payer: COMMERCIAL

## 2024-12-10 VITALS
BODY MASS INDEX: 24.96 KG/M2 | HEIGHT: 67 IN | SYSTOLIC BLOOD PRESSURE: 123 MMHG | OXYGEN SATURATION: 100 % | WEIGHT: 159 LBS | HEART RATE: 74 BPM | DIASTOLIC BLOOD PRESSURE: 75 MMHG
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
## 2024-12-10 DIAGNOSIS — S62.659A CLOSED NONDISPLACED FRACTURE OF MIDDLE PHALANX OF FINGER OF RIGHT HAND: Primary | ICD-10-CM

## 2024-12-10 DIAGNOSIS — S61.451S DOG BITE OF RIGHT HAND, SEQUELA: ICD-10-CM

## 2024-12-10 DIAGNOSIS — Z23 ENCOUNTER FOR IMMUNIZATION: ICD-10-CM

## 2024-12-10 PROBLEM — F41.8 MIXED ANXIETY AND DEPRESSIVE DISORDER: Status: ACTIVE | Noted: 2024-08-13

## 2024-12-10 PROBLEM — M13.0 POLYARTHROPATHY: Status: ACTIVE | Noted: 2024-08-13

## 2024-12-10 PROBLEM — F43.10 POSTTRAUMATIC STRESS DISORDER: Status: ACTIVE | Noted: 2024-08-13

## 2024-12-10 PROCEDURE — 99214 OFFICE O/P EST MOD 30 MIN: CPT | Performed by: FAMILY MEDICINE

## 2024-12-10 PROCEDURE — 90471 IMMUNIZATION ADMIN: CPT | Performed by: FAMILY MEDICINE

## 2024-12-10 PROCEDURE — 90656 IIV3 VACC NO PRSV 0.5 ML IM: CPT | Performed by: FAMILY MEDICINE

## 2024-12-10 RX ORDER — DICLOFENAC SODIUM 75 MG/1
75 TABLET, DELAYED RELEASE ORAL 2 TIMES DAILY PRN
Qty: 30 TABLET | Refills: 1 | Status: SHIPPED | OUTPATIENT
Start: 2024-12-10

## 2024-12-10 NOTE — PROGRESS NOTES
"Chief Complaint  Chief Complaint   Patient presents with    Hospital Follow Up Visit     Dog bite, arm/hand       Subjective    History of Present Illness  Kareem Cruz is a 49 y.o. male presents to [facility] for followup of ER visit for dog bite on arm/hand.     History of Present Illness  The patient presents for evaluation of a nondisplaced fracture of the middle phalanx on the right ring finger, lower back pain, and bruising on the knee and shin.    He reports an incident involving a dog he was dogsitting for attacking him, which resulted in a fracture of his right ring finger. He was referred to a hand surgeon by the ER, but he misplaced the referral. He experiences persistent tingling and numbness in the affected finger, accompanied by significant swelling around the metacarpophalangeal joints. He has attempted to manage the condition with a splint and tape, but these were removed due to moisture exposure. He also notes a deviation in the alignment of the finger. He has a known sensitivity to adhesives. He has been using diclofenac topically for pain management. He was prescribed a biotics and acetaminophen during his ER visit, but he has been self-medicating with ibuprofen.    Additionally, he reports severe lower back pain and bruising on his knee and shin. Despite these symptoms, he believes his knee is functioning adequately. He required assistance to enter his vehicle due to the severity of his pain.    He has not received his influenza vaccine and expresses a desire to do so. He declines the COVID-19 booster at this time.      Objective   Vitals:    12/10/24 1004   BP: 123/75   Pulse: 74   SpO2: 100%   Weight: 72.1 kg (159 lb)   Height: 170.2 cm (67.01\")        BMI is within normal parameters. No other follow-up for BMI required.       Physical Exam  Vitals reviewed.   Constitutional:       Appearance: Normal appearance.   HENT:      Head: Normocephalic and atraumatic. "   Cardiovascular:      Comments: Well perfused  Pulmonary:      Effort: Pulmonary effort is normal. No respiratory distress.   Abdominal:      General: There is no distension.   Musculoskeletal:         General: Swelling, tenderness, deformity and signs of injury present.      Comments: Right ring finger   Neurological:      Mental Status: He is alert. Mental status is at baseline.          The following data was reviewed by: Meenu Reed MD on 12/10/2024:  CMP          8/18/2024    01:28   CMP   Glucose 101    BUN 15    Creatinine 1.04    EGFR 86.9    Sodium 138    Potassium 4.1    Chloride 104    Calcium 8.8    Total Protein 7.1    Albumin 3.7    Globulin 3.4    Total Bilirubin 0.6    Alkaline Phosphatase 132    AST (SGOT) 26    ALT (SGPT) 29    Albumin/Globulin Ratio 1.1    BUN/Creatinine Ratio 14.4    Anion Gap 8.0      CBC          8/18/2024    01:28   CBC   WBC 4.11    RBC 3.83    Hemoglobin 11.5    Hematocrit 33.7    MCV 88.0    MCH 30.0    MCHC 34.1    RDW 12.4    Platelets 236      Most Recent A1C          8/15/2024    09:08   HGBA1C Most Recent   Hemoglobin A1C 5.30      Radiologic studies xray finger and Consultant notes ER note      Assessment and Plan  Kareem Cruz is a 49 y.o. male presents to [facility] today for followup    Assessment & Plan  1. Nondisplaced fracture of the middle phalanx on the right ring finger.  An urgent referral to a hand surgeon has been initiated. He has been advised to apply diclofenac up to twice daily and to take Tylenol 1000 mg every 6 hours as needed for pain. He has been instructed to discontinue ibuprofen use. A kidney function test will be conducted to monitor for potential side effects of the medication. Finger splinted and buddy taped today.    2. Lab abnormalities  Repeat labs today    4. Health maintenance.  He will receive his influenza vaccine today. He has declined the COVID-19 booster at this time.    Diagnoses and all orders for  this visit:    1. Closed nondisplaced fracture of middle phalanx of finger of right hand (Primary)  -     Ambulatory Referral to Hand Surgery  -     diclofenac (VOLTAREN) 75 MG EC tablet; Take 1 tablet by mouth 2 (Two) Times a Day As Needed (pain with food).  Dispense: 30 tablet; Refill: 1    2. Dog bite of right hand, sequela    3. Encounter for immunization  -     Fluzone >6mos ([id number])        Patient voiced understanding and agreement with plan of care and had no further questions or concerns at this time.     Problems addressed: 1 acute complicated injury (MODERATE)   Complexity: labs reviewed yes  Risk: prescription drug management    Meenu Reed MD  Family Medicine  [facility]      Follow Up  Return in about 3 months (around 3/10/2025) for Recheck.    Patient Instructions   Today: Repeat labs    Meds: Diclofenac 75mg for pain- take with food, ok to use tylenol, no ibuprofen    Referrals: Hand surgery- you should receive a call within 1 week to schedule the appointment.  If you do not hear anything please let us know.             Patient or patient representative verbalized consent for the use of Ambient Listening during the visit with  Meenu Reed MD for chart documentation. 12/10/2024  10:53 EST

## 2024-12-10 NOTE — PATIENT INSTRUCTIONS
Today: Repeat labs    Meds: Diclofenac 75mg for pain- take with food, ok to use tylenol, no ibuprofen    Referrals: Hand surgery- you should receive a call within 1 week to schedule the appointment.  If you do not hear anything please let us know.

## 2025-01-02 ENCOUNTER — TELEPHONE (OUTPATIENT)
Dept: FAMILY MEDICINE CLINIC | Facility: CLINIC | Age: 50
End: 2025-01-02
Payer: COMMERCIAL
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
## 2025-01-02 DIAGNOSIS — S61.451S DOG BITE OF RIGHT HAND, SEQUELA: Primary | ICD-10-CM

## 2025-01-02 RX ORDER — SULFAMETHOXAZOLE AND TRIMETHOPRIM 800; 160 MG/1; MG/1
1 TABLET ORAL 2 TIMES DAILY
Qty: 14 TABLET | Refills: 0 | Status: SHIPPED | OUTPATIENT
Start: 2025-01-02 | End: 2025-01-02 | Stop reason: ALTCHOICE

## 2025-01-02 RX ORDER — METRONIDAZOLE 500 MG/1
500 TABLET ORAL 3 TIMES DAILY
Qty: 42 TABLET | Refills: 0 | Status: SHIPPED | OUTPATIENT
Start: 2025-01-02 | End: 2025-01-16

## 2025-01-02 NOTE — TELEPHONE ENCOUNTER
Pt is already taking bactrim, has also been on doxycycline and still has infection. Please advise

## 2025-01-02 NOTE — TELEPHONE ENCOUNTER
Recommend doxycyline or bactrim 2x daily for 14 days, will add metronidazole 500mg 3x daily for 14 days. Advise pt to not drink alcohol while taking metronidazole.

## 2025-01-02 NOTE — TELEPHONE ENCOUNTER
"Caller: Kareem Cruz \"ANGY\"    Relationship: Self    Best call back number [phone]    What is the medical concern/diagnosis: DOG BITE ON HAND     What specialty or service is being requested: HAND SURGEON     Any additional details: CURRENT REFERRAL WITH [facility] AND WAS TOLD THAT THE PROVIDER WOULD NOT BE ABLE TO HELP WITH FINGER AND NEEDED TO LOOK SOMEWHERE ELSE. HE IS NEEDING A REFERRAL ASAP DUE TO INFECTION IN FINGER       "

## 2025-01-03 NOTE — TELEPHONE ENCOUNTER
S/w [facility] scheduling [facility] to [facility] [place] to be seen. No appt were available until April. Pt notified.

## 2025-01-05 ENCOUNTER — TELEPHONE (OUTPATIENT)
Dept: FAMILY MEDICINE CLINIC | Facility: CLINIC | Age: 50
End: 2025-01-05
Payer: COMMERCIAL

## 2025-01-05 DIAGNOSIS — Z12.11 COLON CANCER SCREENING: Primary | ICD-10-CM

## 2025-01-24 NOTE — H&P (VIEW-ONLY)
Chief Compliant:  Right ring finger pain     History:     Patient present with right ring finger pain    Mechanism of Injury:  Attacked by a dog  Date of Injury / Onset:  12/4/2024  Previous Treatment:  Patient was seen in ER and referred to another provider who was not able to see the patient this is his first time seeing someone outside of the hospital    Previous history of Injury:  None    Patient reports he does have ulnar nerve symptoms and has a flexion contracture of his small finger which is unrelated.  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    right Upper Extremity Physical Exam:     Patient has a flexion contracture and obvious deformity and swelling of his PIP of his right ring finger and DIP he is very tender to palpation extreme pain with range of motion    Radiology / Other Tests:     Images of his right hand taken today demonstrate that the patient had suffered a PIP fracture dislocation that was never treated.  He has arthritic changes at the level of the PIP joint he also has a chronic bony mallet finger that the joint is subluxed.    Impression:     Right PIP fracture dislocation neglected and DIP fracture dislocation neglected 2 months out from injury    Plan:     Lengthy discussion with patient about options.  He could have an open reduction internal fixation however I do believe the failure rate would be extremely high.  Other options would include fusion not only of the PIP joint but also the DIP joint.  This to be an outpatient procedure.  He wishes to proceed.  He understands the risk and benefits surgery including but not limited to infection, bleeding, damage nearby structures and the need for more surgery and elects to proceed.  Follow up: 2 weeks postop    UMBERTO NUNO MD

## 2025-01-29 ENCOUNTER — TRANSCRIBE ORDERS (OUTPATIENT)
Dept: SURGERY | Facility: SURGERY CENTER | Age: 50
End: 2025-01-29
Payer: COMMERCIAL

## 2025-01-29 DIAGNOSIS — Z41.9 SURGERY, ELECTIVE: Primary | ICD-10-CM

## 2025-02-06 RX ORDER — FERROUS SULFATE 325(65) MG
325 TABLET ORAL
COMMUNITY

## 2025-02-06 RX ORDER — SULFAMETHOXAZOLE AND TRIMETHOPRIM 800; 160 MG/1; MG/1
TABLET ORAL
Status: ON HOLD | COMMUNITY
Start: 2025-01-02 | End: 2025-02-10 | Stop reason: SDUPTHER

## 2025-02-06 NOTE — SIGNIFICANT NOTE
Education provided the Patient on the following:    - Nothing to Eat or Drink after MN the night before the procedure  -You will need to have someone drive you home after your Surgery and remain with you for 24 hours after the Procedure  - The date of your procedure, your are welcome to have one visitor at bedside or remain within 10-15 minutes of [facility]  -Please wear warm socks when you arrive for your Surgery  -Remove all jewelry and leave any valuables before arriving on the date of your procedure (all will have to be removed before leaving preop)  -You will need to arrive at 0900 on 2-10-25 Surgery  -Feel free to contact us at: [phone] with any additional questions/concerns

## 2025-02-10 ENCOUNTER — ANESTHESIA EVENT (OUTPATIENT)
Dept: SURGERY | Facility: SURGERY CENTER | Age: 50
End: 2025-02-10
Payer: COMMERCIAL

## 2025-02-10 ENCOUNTER — ANESTHESIA (OUTPATIENT)
Dept: SURGERY | Facility: SURGERY CENTER | Age: 50
End: 2025-02-10
Payer: COMMERCIAL

## 2025-02-10 ENCOUNTER — HOSPITAL ENCOUNTER (OUTPATIENT)
Dept: GENERAL RADIOLOGY | Facility: SURGERY CENTER | Age: 50
Setting detail: HOSPITAL OUTPATIENT SURGERY
End: 2025-02-10
Payer: COMMERCIAL

## 2025-02-10 ENCOUNTER — HOSPITAL ENCOUNTER (OUTPATIENT)
Facility: SURGERY CENTER | Age: 50
Setting detail: HOSPITAL OUTPATIENT SURGERY
Discharge: HOME | End: 2025-02-10
Attending: STUDENT IN AN ORGANIZED HEALTH CARE EDUCATION/TRAINING PROGRAM | Admitting: STUDENT IN AN ORGANIZED HEALTH CARE EDUCATION/TRAINING PROGRAM
Payer: COMMERCIAL

## 2025-02-10 VITALS
WEIGHT: 161.8 LBS | DIASTOLIC BLOOD PRESSURE: 87 MMHG | SYSTOLIC BLOOD PRESSURE: 132 MMHG | RESPIRATION RATE: 16 BRPM | OXYGEN SATURATION: 100 % | TEMPERATURE: 98 F | HEIGHT: 67 IN | HEART RATE: 83 BPM | BODY MASS INDEX: 25.39 KG/M2

## 2025-02-10 DIAGNOSIS — Z41.9 SURGERY, ELECTIVE: ICD-10-CM

## 2025-02-10 PROCEDURE — C1713 ANCHOR/SCREW BN/BN,TIS/BN: HCPCS | Performed by: STUDENT IN AN ORGANIZED HEALTH CARE EDUCATION/TRAINING PROGRAM

## 2025-02-10 PROCEDURE — 26860 FUSION OF FINGER JOINT: CPT | Performed by: STUDENT IN AN ORGANIZED HEALTH CARE EDUCATION/TRAINING PROGRAM

## 2025-02-10 PROCEDURE — 25010000002 LIDOCAINE PF 2% 2 % SOLUTION: Performed by: NURSE ANESTHETIST, CERTIFIED REGISTERED

## 2025-02-10 PROCEDURE — 25010000002 ONDANSETRON PER 1 MG: Performed by: NURSE ANESTHETIST, CERTIFIED REGISTERED

## 2025-02-10 PROCEDURE — 76000 FLUOROSCOPY <1 HR PHYS/QHP: CPT

## 2025-02-10 PROCEDURE — 25010000002 KETOROLAC TROMETHAMINE PER 15 MG: Performed by: NURSE ANESTHETIST, CERTIFIED REGISTERED

## 2025-02-10 PROCEDURE — 25010000002 CEFAZOLIN IN DEXTROSE 2000 MG/ 100 ML SOLUTION: Performed by: STUDENT IN AN ORGANIZED HEALTH CARE EDUCATION/TRAINING PROGRAM

## 2025-02-10 PROCEDURE — 25010000002 PROPOFOL 200 MG/20ML EMULSION: Performed by: NURSE ANESTHETIST, CERTIFIED REGISTERED

## 2025-02-10 PROCEDURE — 25010000002 HYDROMORPHONE 1 MG/ML SOLUTION: Performed by: NURSE ANESTHETIST, CERTIFIED REGISTERED

## 2025-02-10 PROCEDURE — 25010000002 DEXAMETHASONE SODIUM PHOSPHATE 20 MG/5ML SOLUTION: Performed by: NURSE ANESTHETIST, CERTIFIED REGISTERED

## 2025-02-10 PROCEDURE — 25010000002 FENTANYL CITRATE (PF) 50 MCG/ML SOLUTION: Performed by: NURSE ANESTHETIST, CERTIFIED REGISTERED

## 2025-02-10 PROCEDURE — 25810000003 LACTATED RINGERS PER 1000 ML: Performed by: STUDENT IN AN ORGANIZED HEALTH CARE EDUCATION/TRAINING PROGRAM

## 2025-02-10 RX ORDER — KETOROLAC TROMETHAMINE 30 MG/ML
INJECTION, SOLUTION INTRAMUSCULAR; INTRAVENOUS AS NEEDED
Status: DISCONTINUED | OUTPATIENT
Start: 2025-02-10 | End: 2025-02-10 | Stop reason: SURG

## 2025-02-10 RX ORDER — ONDANSETRON 2 MG/ML
INJECTION INTRAMUSCULAR; INTRAVENOUS AS NEEDED
Status: DISCONTINUED | OUTPATIENT
Start: 2025-02-10 | End: 2025-02-10 | Stop reason: SURG

## 2025-02-10 RX ORDER — FENTANYL CITRATE 50 UG/ML
50 INJECTION, SOLUTION INTRAMUSCULAR; INTRAVENOUS
Status: DISCONTINUED | OUTPATIENT
Start: 2025-02-10 | End: 2025-02-10 | Stop reason: HOSPADM

## 2025-02-10 RX ORDER — PROMETHAZINE HYDROCHLORIDE 25 MG/1
25 SUPPOSITORY RECTAL ONCE AS NEEDED
Status: DISCONTINUED | OUTPATIENT
Start: 2025-02-10 | End: 2025-02-10 | Stop reason: HOSPADM

## 2025-02-10 RX ORDER — LIDOCAINE HYDROCHLORIDE 20 MG/ML
INJECTION, SOLUTION EPIDURAL; INFILTRATION; INTRACAUDAL; PERINEURAL AS NEEDED
Status: DISCONTINUED | OUTPATIENT
Start: 2025-02-10 | End: 2025-02-10 | Stop reason: SURG

## 2025-02-10 RX ORDER — ACETAMINOPHEN 500 MG
500 TABLET ORAL EVERY 4 HOURS PRN
Qty: 30 TABLET | Refills: 0 | Status: SHIPPED | OUTPATIENT
Start: 2025-02-10

## 2025-02-10 RX ORDER — DEXAMETHASONE SODIUM PHOSPHATE 4 MG/ML
INJECTION, SOLUTION INTRA-ARTICULAR; INTRALESIONAL; INTRAMUSCULAR; INTRAVENOUS; SOFT TISSUE AS NEEDED
Status: DISCONTINUED | OUTPATIENT
Start: 2025-02-10 | End: 2025-02-10 | Stop reason: SURG

## 2025-02-10 RX ORDER — SULFAMETHOXAZOLE AND TRIMETHOPRIM 800; 160 MG/1; MG/1
1 TABLET ORAL DAILY
COMMUNITY
Start: 2025-01-22

## 2025-02-10 RX ORDER — PROPOFOL 10 MG/ML
INJECTION, EMULSION INTRAVENOUS AS NEEDED
Status: DISCONTINUED | OUTPATIENT
Start: 2025-02-10 | End: 2025-02-10 | Stop reason: SURG

## 2025-02-10 RX ORDER — NALOXONE HCL 0.4 MG/ML
0.2 VIAL (ML) INJECTION AS NEEDED
Status: DISCONTINUED | OUTPATIENT
Start: 2025-02-10 | End: 2025-02-10 | Stop reason: HOSPADM

## 2025-02-10 RX ORDER — DIPHENHYDRAMINE HYDROCHLORIDE 50 MG/ML
12.5 INJECTION INTRAMUSCULAR; INTRAVENOUS
Status: DISCONTINUED | OUTPATIENT
Start: 2025-02-10 | End: 2025-02-10 | Stop reason: HOSPADM

## 2025-02-10 RX ORDER — SODIUM CHLORIDE 0.9 % (FLUSH) 0.9 %
10 SYRINGE (ML) INJECTION AS NEEDED
Status: DISCONTINUED | OUTPATIENT
Start: 2025-02-10 | End: 2025-02-10 | Stop reason: HOSPADM

## 2025-02-10 RX ORDER — OXYCODONE HYDROCHLORIDE 5 MG/1
5 TABLET ORAL EVERY 6 HOURS PRN
Qty: 25 TABLET | Refills: 0 | Status: SHIPPED | OUTPATIENT
Start: 2025-02-10

## 2025-02-10 RX ORDER — DORAVIRINE 100 MG/1
100 TABLET, FILM COATED ORAL DAILY
COMMUNITY
Start: 2025-01-22

## 2025-02-10 RX ORDER — PROMETHAZINE HYDROCHLORIDE 12.5 MG/1
25 TABLET ORAL ONCE AS NEEDED
Status: DISCONTINUED | OUTPATIENT
Start: 2025-02-10 | End: 2025-02-10 | Stop reason: HOSPADM

## 2025-02-10 RX ORDER — B-COMPLEX WITH VITAMIN C
1 TABLET ORAL DAILY
COMMUNITY

## 2025-02-10 RX ORDER — DROPERIDOL 2.5 MG/ML
0.62 INJECTION, SOLUTION INTRAMUSCULAR; INTRAVENOUS
Status: DISCONTINUED | OUTPATIENT
Start: 2025-02-10 | End: 2025-02-10 | Stop reason: HOSPADM

## 2025-02-10 RX ORDER — IBUPROFEN 600 MG/1
600 TABLET, FILM COATED ORAL EVERY 6 HOURS PRN
Qty: 30 TABLET | Refills: 0 | Status: SHIPPED | OUTPATIENT
Start: 2025-02-10

## 2025-02-10 RX ORDER — MAGNESIUM HYDROXIDE 1200 MG/15ML
LIQUID ORAL AS NEEDED
Status: DISCONTINUED | OUTPATIENT
Start: 2025-02-10 | End: 2025-02-10 | Stop reason: HOSPADM

## 2025-02-10 RX ORDER — SODIUM CHLORIDE, SODIUM LACTATE, POTASSIUM CHLORIDE, CALCIUM CHLORIDE 600; 310; 30; 20 MG/100ML; MG/100ML; MG/100ML; MG/100ML
1000 INJECTION, SOLUTION INTRAVENOUS CONTINUOUS
Status: ACTIVE | OUTPATIENT
Start: 2025-02-10 | End: 2025-02-10

## 2025-02-10 RX ORDER — LIDOCAINE HYDROCHLORIDE 10 MG/ML
0.5 INJECTION, SOLUTION INFILTRATION; PERINEURAL ONCE AS NEEDED
Status: DISCONTINUED | OUTPATIENT
Start: 2025-02-10 | End: 2025-02-10 | Stop reason: HOSPADM

## 2025-02-10 RX ORDER — FLUMAZENIL 0.1 MG/ML
0.2 INJECTION INTRAVENOUS AS NEEDED
Status: DISCONTINUED | OUTPATIENT
Start: 2025-02-10 | End: 2025-02-10 | Stop reason: HOSPADM

## 2025-02-10 RX ORDER — BUPIVACAINE HYDROCHLORIDE AND EPINEPHRINE 5; 5 MG/ML; UG/ML
INJECTION, SOLUTION PERINEURAL AS NEEDED
Status: DISCONTINUED | OUTPATIENT
Start: 2025-02-10 | End: 2025-02-10 | Stop reason: HOSPADM

## 2025-02-10 RX ORDER — ONDANSETRON 2 MG/ML
4 INJECTION INTRAMUSCULAR; INTRAVENOUS ONCE AS NEEDED
Status: DISCONTINUED | OUTPATIENT
Start: 2025-02-10 | End: 2025-02-10 | Stop reason: HOSPADM

## 2025-02-10 RX ORDER — ATROPINE SULFATE 0.4 MG/ML
0.4 INJECTION, SOLUTION INTRAMUSCULAR; INTRAVENOUS; SUBCUTANEOUS ONCE AS NEEDED
Status: DISCONTINUED | OUTPATIENT
Start: 2025-02-10 | End: 2025-02-10 | Stop reason: HOSPADM

## 2025-02-10 RX ORDER — CEFAZOLIN SODIUM 2 G/100ML
2000 INJECTION, SOLUTION INTRAVENOUS ONCE
Status: COMPLETED | OUTPATIENT
Start: 2025-02-10 | End: 2025-02-10

## 2025-02-10 RX ORDER — OXYCODONE AND ACETAMINOPHEN 5; 325 MG/1; MG/1
1 TABLET ORAL ONCE
Status: COMPLETED | OUTPATIENT
Start: 2025-02-10 | End: 2025-02-10

## 2025-02-10 RX ORDER — FENTANYL CITRATE 0.05 MG/ML
INJECTION, SOLUTION INTRAMUSCULAR; INTRAVENOUS AS NEEDED
Status: DISCONTINUED | OUTPATIENT
Start: 2025-02-10 | End: 2025-02-10 | Stop reason: SURG

## 2025-02-10 RX ORDER — ASPIRIN 81 MG/1
TABLET ORAL DAILY
COMMUNITY

## 2025-02-10 RX ADMIN — FENTANYL CITRATE 50 MCG: 50 INJECTION INTRAMUSCULAR; INTRAVENOUS at 11:22

## 2025-02-10 RX ADMIN — ONDANSETRON 4 MG: 2 INJECTION INTRAMUSCULAR; INTRAVENOUS at 11:46

## 2025-02-10 RX ADMIN — SODIUM CHLORIDE, POTASSIUM CHLORIDE, SODIUM LACTATE AND CALCIUM CHLORIDE 1000 ML: 600; 310; 30; 20 INJECTION, SOLUTION INTRAVENOUS at 09:34

## 2025-02-10 RX ADMIN — HYDROMORPHONE HYDROCHLORIDE 0.5 MG: 1 INJECTION, SOLUTION INTRAMUSCULAR; INTRAVENOUS; SUBCUTANEOUS at 11:40

## 2025-02-10 RX ADMIN — DEXAMETHASONE SODIUM PHOSPHATE 4 MG: 4 INJECTION, SOLUTION INTRAMUSCULAR; INTRAVENOUS at 11:30

## 2025-02-10 RX ADMIN — KETOROLAC TROMETHAMINE 30 MG: 30 INJECTION, SOLUTION INTRAMUSCULAR; INTRAVENOUS at 13:08

## 2025-02-10 RX ADMIN — LIDOCAINE HYDROCHLORIDE 100 MG: 20 INJECTION, SOLUTION EPIDURAL; INFILTRATION; INTRACAUDAL; PERINEURAL at 11:23

## 2025-02-10 RX ADMIN — PROPOFOL 200 MG: 10 INJECTION, EMULSION INTRAVENOUS at 11:23

## 2025-02-10 RX ADMIN — CEFAZOLIN SODIUM 2000 MG: 2 INJECTION, SOLUTION INTRAVENOUS at 11:20

## 2025-02-10 RX ADMIN — OXYCODONE HYDROCHLORIDE AND ACETAMINOPHEN 1 TABLET: 5; 325 TABLET ORAL at 13:49

## 2025-02-10 NOTE — OP NOTE
FINGER ARTHRODESIS  Procedure Note    Kareem Cruz  2/10/2025    Pre-op Diagnosis: Right proximal interphalangeal joint fracture dislocation ring finger, fracture dislocation distal interphalangeal joint ring finger.   Post-op Diagnosis: Same  Procedure:   Right ring finger proximal interphalangeal joint fusion  Right ring finger distal interphalangeal joint fusion  Surgeon:  Danii Bernabe MD  Assistant: None  Anesthesia: General, Anesthesiologist: Stephan Porter MD  CRNA: Lucy Pacheco CRNA  Staff: Circulator: Mary Castellon RN; Lona Hernandez RN  Scrub Person: Georgia Leal; Tammi Garcia CFA  Estimated Blood Loss: none  Specimens: * No orders in the log *  Drains: none  Complications: None    Components Utilized:    Implant Name Type Inv. Item Serial No.  Lot No. LRB No. Used Action   WR SUT Cerclage suturewire 20 Gauge Implant     Right 1 Implanted   screw, arthrodesis, 2.0 x 24 mm, Ti      Right 1 Implanted   SCRW COMPR REDUCT NOHD TI 2.5X16MM - [id number] Implant SCRW COMPR REDUCT NOHD TI 2.5X16MM  SKELETAL DYNAMICS  Right 1 Implanted       Indication for Procedure:  This patient is a 49 y.o. male who had a attacked by a dog to his right hand several months ago he suffered a fracture dislocation of his PIP joint and his DIP joint.  Surgical options and non-surgical options were discussed in detail and to the patient's satisfaction.  Surgical intervention was recommended based on the patient's injury and functional status.      The risks and benefits of surgery were discussed with patient and informed consent was obtained.  Risks include but are not limited to, infection, bleeding, nerve injury, blood clots, risks associated with anesthesia, need for further surgery, persistent pain, and possibly death.      DESCRIPTION OF PROCEDURE:     The patient was seen in Preoperative Holding Area where their surgical site was marked. Preoperative antibiotics were received. H&P and  consent updated. They were taken to the Operating Room and provided general anesthesia on the Operating Room table.     His right hand was placed supine on a hand table.  His right upper extremity was prepped and draped in the normal sterile fashion including a prescript followed by alcohol followed by ChloraPrep.  Before the start of procedure a timeout was performed and he with the right patient the right procedure and all the necessary equipment in the room.  A Esmarch bandage was used to exsanguinate his upper extremity.  I made a curvilinear-year-old dorsal incision over the ring finger radial-based.  Full-thickness skin flaps were elevated.  The common extensor tendon was identified and incised longitudinally for further repair later.  Full-thickness flaps of the extensor tendon were elevated.  The joint was identified and the collateral ligaments were released.  The joint was dislocated noted to be severe significant arthritis both on the  proximal and dorsal aspect.  A sawblade was used to make fresh cuts.  I made a straight cut on the middle phalanx I made an angled cut to allow for roughly 40 degrees of flexion on the proximal phalanx head.  Reduction was obtained  Under fluoroscopy and I was happy with the overall length alignment and rotation and the amount of flexion across the PIP joint.  A K wire was placed across this to allow for a skeletal dynamic 2.5 mm compression screw.  This was measured to be roughly a 20 I allowed to take for off for a 16 mm length screw.  This was drilled over and then a screw was inserted under fluoroscopy.  I was happy with my over length alignment and rotation.    Attention was then taken to the distal interphalangeal joint.  An H-type incision was made over the DIP joint.  The extensor tendon was cut at the level of the DIP.  The ligaments were released and the joint was dislocated.  I saw was used to make flat edges on both the proximal and dorsal aspect of the DIP  joint.  A K wire was placed in an antegrade fashion through the distal phalanx and then retrograde through the middle phalanx x-rays were taken to confirm it was center center and the joint was concentrically reduced.  I elected to proceed with a 2.0 x 24 mm arthrodesis screw.  This is noncannulated so the wire was reamed over.  And then the screw was inserted under fluoroscopy care confirming that I had appropriate alignment of the nail equal to all the other nails.  The tourniquet was let down at 89 minutes.  The wounds were copiously irrigated.  The extensor tendon was closed with a running 4-0  FiberWire.  The skin was closed with 4-0 Chromic Gut.  At the end the procedure the count was correct complications none specimens none     Postoperative Plan:  patient will remain in his dorsal blocking splint for 2 weeks.  I may make him get a custom postop splint for guided protection of his PIP joint.No use of the right hand.      Danii Bernabe MD

## 2025-02-10 NOTE — ANESTHESIA PREPROCEDURE EVALUATION
Anesthesia Evaluation     Patient summary reviewed   NPO Solid Status: > 8 hours  NPO Liquid Status: > 2 hours           Airway   Mallampati: I  TM distance: >3 FB  Neck ROM: full  Dental          Pulmonary - normal exam   (+) a smoker (quit 2015) Former, cigarettes,  (-) COPD, asthma, sleep apnea  Cardiovascular - normal exam  Exercise tolerance: good (4-7 METS)    (-) hypertension, past MI, CAD, dysrhythmias, angina      Neuro/Psych  (+) psychiatric history Anxiety, Depression and PTSD  (-) seizures, TIA, CVA  GI/Hepatic/Renal/Endo    (+) obesity, GERD    Musculoskeletal     (+) back pain  Abdominal    Substance History      OB/GYN          Other   arthritis,   history of cancer (skin)      Other Comment: HIV+ on antiretrovirals  Syphilis                     Anesthesia Plan    ASA 3     general     (Complications of general anesthesia include but not limited to awareness under anesthesia, nausea, vomiting, sore throat, hoarseness, chipped or cracked teeth, MI, CVA, or serious allergic reaction.     Surgeon to localized on the field)  intravenous induction     Anesthetic plan, risks, benefits, and alternatives have been provided, discussed and informed consent has been obtained with: patient and spouse/significant other.    Plan discussed with CRNA and attending.        CODE STATUS:

## 2025-02-10 NOTE — INTERVAL H&P NOTE
H&P reviewed. The patient was examined and there are no changes to the H&P.  Plan today for fusion of his PIP and DIP joint of his finger.  He understands the risks and benefits and wished to proceed.  Questions encouraged and answered no guarantees given review of systems negative limited range of motion of his ring finger

## 2025-02-13 NOTE — ANESTHESIA POSTPROCEDURE EVALUATION
Patient: Kareem Cruz    Procedure Summary       Date: 02/10/25 Room / Location: [facility]    Anesthesia Start: 1119 Anesthesia Stop: 1322    Procedure: RIGHT RING PROXIMAL INTERPHALANGEAL FUSION, RIGHT RING DISTAL INTERPHALANGEAL FUSION (Right: Fingers) Diagnosis:       Traumatic closed nondisplaced fracture of middle phalanx of finger      (Traumatic closed nondisplaced fracture of middle phalanx of finger [S62.659A])    Surgeons: Danii Bernabe MD Provider: Stephan Porter MD    Anesthesia Type: general ASA Status: 3            Anesthesia Type: general    Vitals  Vitals Value Taken Time   /80 02/10/25 1336   Temp 36.7 °C (98 °F) 02/10/25 1324   Pulse 81 02/10/25 1336   Resp 16 02/10/25 1324   SpO2 99 % 02/10/25 1336   Vitals shown include unfiled device data.        Post Anesthesia Care and Evaluation    Patient location: did not personally evaluate patient.    Comments: Discharge criteria met per RN

## 2025-03-10 ENCOUNTER — OFFICE VISIT (OUTPATIENT)
Dept: FAMILY MEDICINE CLINIC | Facility: CLINIC | Age: 50
End: 2025-03-10
Payer: COMMERCIAL

## 2025-03-10 VITALS
DIASTOLIC BLOOD PRESSURE: 74 MMHG | HEART RATE: 95 BPM | BODY MASS INDEX: 25.33 KG/M2 | HEIGHT: 67 IN | WEIGHT: 161.4 LBS | OXYGEN SATURATION: 97 % | SYSTOLIC BLOOD PRESSURE: 118 MMHG

## 2025-03-10 DIAGNOSIS — L03.011 CELLULITIS OF FINGER OF RIGHT HAND: Primary | ICD-10-CM

## 2025-03-10 DIAGNOSIS — G89.29 CHRONIC BILATERAL BACK PAIN, UNSPECIFIED BACK LOCATION: ICD-10-CM

## 2025-03-10 DIAGNOSIS — M54.9 CHRONIC BILATERAL BACK PAIN, UNSPECIFIED BACK LOCATION: ICD-10-CM

## 2025-03-10 DIAGNOSIS — F41.8 MIXED ANXIETY AND DEPRESSIVE DISORDER: ICD-10-CM

## 2025-03-10 DIAGNOSIS — L73.9 FOLLICULITIS: ICD-10-CM

## 2025-03-10 DIAGNOSIS — G24.01 TARDIVE DYSKINESIA: ICD-10-CM

## 2025-03-10 DIAGNOSIS — F43.10 POSTTRAUMATIC STRESS DISORDER: ICD-10-CM

## 2025-03-10 PROCEDURE — 99214 OFFICE O/P EST MOD 30 MIN: CPT | Performed by: FAMILY MEDICINE

## 2025-03-10 RX ORDER — DOXYCYCLINE 100 MG/1
100 CAPSULE ORAL 2 TIMES DAILY
Qty: 14 CAPSULE | Refills: 0 | Status: SHIPPED | OUTPATIENT
Start: 2025-03-10
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
## 2025-03-10 NOTE — PATIENT INSTRUCTIONS
Meds: doxycycline for finger  Mupirocin ointment    Referrals: Pain management for back  Psych for ingrezza  Finish colonoscopy paperwork

## 2025-04-14 ENCOUNTER — OFFICE VISIT (OUTPATIENT)
Age: 50
End: 2025-04-14
Payer: COMMERCIAL

## 2025-04-14 VITALS
SYSTOLIC BLOOD PRESSURE: 126 MMHG | DIASTOLIC BLOOD PRESSURE: 89 MMHG | BODY MASS INDEX: 25.56 KG/M2 | HEART RATE: 84 BPM | WEIGHT: 163.2 LBS | OXYGEN SATURATION: 96 %

## 2025-04-14 DIAGNOSIS — F41.0 GENERALIZED ANXIETY DISORDER WITH PANIC ATTACKS: ICD-10-CM

## 2025-04-14 DIAGNOSIS — F43.10 POST TRAUMATIC STRESS DISORDER (PTSD): ICD-10-CM

## 2025-04-14 DIAGNOSIS — F41.1 GENERALIZED ANXIETY DISORDER WITH PANIC ATTACKS: ICD-10-CM

## 2025-04-14 DIAGNOSIS — F33.1 MDD (MAJOR DEPRESSIVE DISORDER), RECURRENT EPISODE, MODERATE: ICD-10-CM

## 2025-04-14 DIAGNOSIS — G24.01 TARDIVE DYSKINESIA: Primary | ICD-10-CM

## 2025-04-14 PROCEDURE — 90792 PSYCH DIAG EVAL W/MED SRVCS: CPT

## 2025-04-14 RX ORDER — VALBENAZINE 40 MG/1
40 CAPSULE ORAL DAILY
Qty: 30 CAPSULE | Refills: 1 | Status: SHIPPED | OUTPATIENT
Start: 2025-04-14 | End: 2025-06-13
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
## 2025-04-16 ENCOUNTER — TELEPHONE (OUTPATIENT)
Age: 50
End: 2025-04-16
Payer: COMMERCIAL

## 2025-04-16 DIAGNOSIS — F33.1 MDD (MAJOR DEPRESSIVE DISORDER), RECURRENT EPISODE, MODERATE: ICD-10-CM

## 2025-04-16 DIAGNOSIS — F41.0 GENERALIZED ANXIETY DISORDER WITH PANIC ATTACKS: ICD-10-CM

## 2025-04-16 DIAGNOSIS — F41.1 GENERALIZED ANXIETY DISORDER WITH PANIC ATTACKS: ICD-10-CM

## 2025-04-16 DIAGNOSIS — F43.10 POST TRAUMATIC STRESS DISORDER (PTSD): Primary | ICD-10-CM

## 2025-04-16 NOTE — TELEPHONE ENCOUNTER
LM FOR PATIENT LETTING HIM KNOW THAT HIS INSURANCE IS REQUIRING A PA FOR THIS MEDICATION AND THAT IT HAS BEEN SUBMITTED.  I WILL LET HIM KNOW WHEN I HEAR BACK FROM HIS INSURANCE COMPANY

## 2025-04-18 ENCOUNTER — PATIENT ROUNDING (BHMG ONLY) (OUTPATIENT)
Age: 50
End: 2025-04-18
Payer: COMMERCIAL

## 2025-04-18 NOTE — TELEPHONE ENCOUNTER
Spoke with Dillan at [employer] and the PA is still in review, it can take up to 5 to 7 business days to complete a review.    Spoke with patient and informed him of update and he voiced understanding

## 2025-04-18 NOTE — TELEPHONE ENCOUNTER
Rec'd  PA approval and approval faxed to pharmacy [facility] in [place]   dicyclomine (BENTYL) 20 MG tablet-last refill 1/6/2021 #90 with 3 refills    LORazepam (ATIVAN) 2 MG tablet-last refill 3/8/2021 #60 with 1 refill     Last office visit 2/8/2021 with pending appointment 5/10/2021    Routed to Niki Villalba MD

## 2025-05-05 ENCOUNTER — PREP FOR SURGERY (OUTPATIENT)
Dept: SURGERY | Facility: SURGERY CENTER | Age: 50
End: 2025-05-05
Payer: COMMERCIAL

## 2025-05-05 DIAGNOSIS — Z80.0 FAMILY HISTORY OF COLON CANCER: Primary | ICD-10-CM

## 2025-05-14 ENCOUNTER — TELEPHONE (OUTPATIENT)
Age: 50
End: 2025-05-14

## 2025-05-19 ENCOUNTER — TELEPHONE (OUTPATIENT)
Age: 50
End: 2025-05-19

## 2025-05-19 NOTE — TELEPHONE ENCOUNTER
Patient called to r/s his appt with Kia for today, he mentioned that he has never picked up his Ingrezza script that was prescribed in April, he said that he was told by pharmacy that it was going to cost $800, he had a savings card and gave them the numbers and he never heard back.    I called pharmacy and spoke with Kiran who said that the savings card numbers he provided were incorrect and would not work an they could not reach patient - I found a new savings card and provided numbers to Kiran, he ran it thru and it went thru at no cost to the patient and they will mail to him today    Patient informed

## 2025-05-30 ENCOUNTER — TELEPHONE (OUTPATIENT)
Dept: FAMILY MEDICINE CLINIC | Facility: CLINIC | Age: 50
End: 2025-05-30
Payer: COMMERCIAL

## 2025-06-04 ENCOUNTER — TELEPHONE (OUTPATIENT)
Age: 50
End: 2025-06-04
Payer: COMMERCIAL

## 2025-07-08 ENCOUNTER — TELEPHONE (OUTPATIENT)
Age: 50
End: 2025-07-08

## 2025-07-08 NOTE — TELEPHONE ENCOUNTER
Called PT to let him know he missed his new pt appt this morning with Jyoti and the PT answered the phone and then hung the phone up making me not able to make contact with the PT or able to leave a VM

## 2025-07-28 ENCOUNTER — OFFICE VISIT (OUTPATIENT)
Dept: FAMILY MEDICINE CLINIC | Facility: CLINIC | Age: 50
End: 2025-07-28
Payer: COMMERCIAL

## 2025-07-28 VITALS
WEIGHT: 162 LBS | SYSTOLIC BLOOD PRESSURE: 112 MMHG | HEART RATE: 85 BPM | TEMPERATURE: 98.3 F | BODY MASS INDEX: 25.43 KG/M2 | DIASTOLIC BLOOD PRESSURE: 72 MMHG | HEIGHT: 67 IN | OXYGEN SATURATION: 99 %

## 2025-07-28 DIAGNOSIS — L57.0 ACTINIC KERATOSIS: ICD-10-CM

## 2025-07-28 DIAGNOSIS — M54.9 CHRONIC BILATERAL BACK PAIN, UNSPECIFIED BACK LOCATION: ICD-10-CM

## 2025-07-28 DIAGNOSIS — G89.29 CHRONIC BILATERAL BACK PAIN, UNSPECIFIED BACK LOCATION: ICD-10-CM

## 2025-07-28 DIAGNOSIS — K52.9 COLITIS: Primary | ICD-10-CM

## 2025-07-28 DIAGNOSIS — B20 CURRENTLY ASYMPTOMATIC HIV INFECTION, WITH HISTORY OF HIV-RELATED ILLNESS: ICD-10-CM

## 2025-07-28 PROBLEM — A53.9 SYPHILIS: Status: RESOLVED | Noted: 2024-08-13 | Resolved: 2025-07-28

## 2025-07-28 PROCEDURE — 99215 OFFICE O/P EST HI 40 MIN: CPT | Performed by: FAMILY MEDICINE

## 2025-07-28 RX ORDER — VALBENAZINE 40 MG/1
CAPSULE ORAL
COMMUNITY

## 2025-07-28 RX ORDER — FLUOROURACIL 50 MG/G
1 CREAM TOPICAL 2 TIMES DAILY
Qty: 40 G | Refills: 0 | Status: SHIPPED | OUTPATIENT
Start: 2025-07-28

## 2025-07-28 RX ORDER — DICYCLOMINE HCL 20 MG
20 TABLET ORAL EVERY 6 HOURS
COMMUNITY
Start: 2025-07-25

## 2025-07-28 NOTE — PATIENT INSTRUCTIONS
Back pain plan   - Symptomatic relief with heat, ice, Epsom salt soak, OTC Tylenol 1000 mg every 6 hours with food and water (max 4000 mg daily), massage, topical relief with voltaren gel or arnica cream  - amitriptyline 25mg nightly for pain, can increase dose if needed after a few weeks  - Consider acupuncture. Dr. Reed's acupuncture clinic is on Tuesdays, self pay, usually $.    [facility] is in network with some commercial insurances-not Medicare, Medicaid, or anthem-and have evening and Saturday appointments available. [email or web address]   [facility] offers sliding scale pricing and has evening appointments a few days a week. [email or web address]  - Referral to pain management, you should receive a call within 1 week to schedule the appointment.  If you do not hear anything please let us know.    AK's  - 5-FU 2x daily for 4 weeks, may repeat

## 2025-07-28 NOTE — PROGRESS NOTES
Chief Complaint  Chief Complaint   Patient presents with    ER followup    Abdominal Pain    Back Pain    Actinic Keratosis       Subjective    History of Present Illness  Kareem Cruz is a 50 y.o. male presents to [facility] for followup    History of Present Illness  The patient presents for evaluation of colitis, back pain, actinic keratosis, and HIV.    Four days ago, he experienced a sharp abdominal pain near the umbilicus and left side, which he initially attributed to gas due to his plant-based diet. The following day, he developed a low-grade fever and joint pain in his elbows, legs, and back, which was severe enough to prevent him from performing daily activities such as picking up his cat or walking his dog. He also reported a sensation of heaviness in his abdomen, constipation, and loud bowel sounds. Despite not eating for over a day, he managed to consume Gatorade and applesauce. He later had a bowel movement that was gelatinous and grossly bloody, with occasional liquid stool. He has a history of colectomy and 11 abdominal surgeries. This prompted him to seek emergency care. He was treated with rifampin, metronidazole, and morphine, but continued to feel unwell, although without fever. He was discharged home. He also reported an episode of syncope, which was a new symptom for him. His blood sugar level was 132 after fasting for a day and a half, which is higher than his usual level of around 60. He also noted low sodium levels and high bilirubin levels. He continues to experience abdominal discomfort and has gained 15 pounds in the past 9 days. A CT scan revealed that his colon is filled with liquid stool. He is scheduled for a colonoscopy on 08/22/2025.     He has not been compliant with his HIV medications and is seeking a change due to difficulty swallowing them. He has an appointment with ID in 2 days.    He has been experiencing a crackling sensation in his head,  "accompanied by stabbing pain and pressure-like swelling. This occurred 6 to 8 times yesterday and a few times the day before. He also reports an unusual sensation when moving his eyes to the side.    He also is having worsening of back pain and feels it is becoming unbearable.  He has a history of compression and stenosis in his back, for which he has received injections in the neck but never the low back. He has not tried amitriptyline for pain management. He has previously taken Flexeril but did not find it helpful. He has not tried acupuncture. He has a history of drug addiction and alcoholism and does not like oxycodone due to its side effects. He is allergic to gabapentin, which causes hallucinations.    He has multiple actinic keratoses on his hands, previously treated with cryotherapy by dermatology. He has used fluorouracil in the past for subungual warts.    Social History:  Diet: Plant-based diet  Alcohol: History of alcoholism  Recreational Drugs: History of drug addiction      Objective   Vitals:    07/28/25 1348   BP: 112/72   Pulse: 85   Temp: 98.3 °F (36.8 °C)   SpO2: 99%   Weight: 73.5 kg (162 lb)   Height: 170.2 cm (67.01\")      Physical Exam  Vitals reviewed.   Constitutional:       Appearance: Normal appearance.   HENT:      Head: Normocephalic and atraumatic.   Cardiovascular:      Comments: Well perfused  Pulmonary:      Effort: Pulmonary effort is normal. No respiratory distress.   Abdominal:      General: There is no distension.   Musculoskeletal:         General: Normal range of motion.   Neurological:      Mental Status: He is alert. Mental status is at baseline.          The following data was reviewed by: Meenu Reed MD on 07/28/2025:  CMP          8/18/2024    01:28   CMP   Glucose 101    BUN 15    Creatinine 1.04    EGFR 86.9    Sodium 138    Potassium 4.1    Chloride 104    Calcium 8.8    Total Protein 7.1    Albumin 3.7    Globulin 3.4    Total Bilirubin 0.6    Alkaline Phosphatase " 132    AST (SGOT) 26    ALT (SGPT) 29    Albumin/Globulin Ratio 1.1    BUN/Creatinine Ratio 14.4    Anion Gap 8.0      CBC          8/18/2024    01:28 12/10/2024    10:46   CBC   WBC 4.11  5.1    RBC 3.83  4.15    Hemoglobin 11.5  13.7    Hematocrit 33.7  37.8    MCV 88.0  91    MCH 30.0  33.0    MCHC 34.1  36.2    RDW 12.4  13.6    Platelets 236  222      Radiologic studies CT abdomen pelvis      Assessment and Plan  Kareem Cruz is a 50 y.o. male presents to [facility] today for followup    Assessment & Plan  1. Colitis.  - Continues to have abdominal discomfort and loose bowel movements, but is afebrile. His weight has decreased from 166 pounds in the ER to 162 pounds today, indicating a positive trend towards baseline weight. Low RBC and hematocrit on recent labs from the ER; the patient is currently taking iron tablets. Concerning for occult GI bleed based on history.   - A colonoscopy is scheduled for 08/22/2025. Messaged Dr. Cook regarding the potential rectal bleeding, which may have been mislabeled as gastric, his office will contact patient for followup this weekt o evaluate if cscope needs to be moved up    2. Back pain.  - A referral to pain management will be made to discuss lumbar injection.  - Amitriptyline 25 mg nightly will be prescribed to aid sleep and alleviate pain; will start with low dose and may increase if no response  - Acupuncture is recommended as an additional treatment option.    3. Actinic keratosis.  - Fluorouracil will be applied twice daily for 4 weeks.  - If this treatment proves ineffective, a referral to dermatology will be considered.    4. HIV.  - The patient has not been compliant with HIV medications and needs a change due to difficulty swallowing them. An appointment with infectious disease is scheduled for 07/30/2025.      Follow-up: The patient will follow up for annual visit in September.    Diagnoses and all orders for this visit:    1.  Colitis (Primary)    2. Chronic bilateral back pain, unspecified back location  -     Ambulatory Referral to Pain Management  -     amitriptyline (ELAVIL) 25 MG tablet; Take 1 tablet by mouth Every Night. Indications: Chronic Pain  Dispense: 30 tablet; Refill: 1    3. Actinic keratosis  -     fluorouracil (EFUDEX) 5 % cream; Apply 1 Application topically to the appropriate area as directed 2 (Two) Times a Day. Indications: Actinic Keratosis  Dispense: 40 g; Refill: 0    4. Currently asymptomatic HIV infection, with history of HIV-related illness        Patient voiced understanding and agreement with plan of care and had no further questions or concerns at this time. Medical student present during appointment with patient consent; any time patient spent talking to the medical student or being examined by medical student not included in billable time.    I spent 45 minutes on this encounter, including chart review of any relevant previous encounters, labs, and imaging and coordination of care  Problems addressed: 1 undiagnosed new problem with an uncertain prognosis (MODERATE) established problem:  worsening or not responding to treatment, established problem: inadequately controlled,  Complexity: labs reviewed yes, coordinationof care with external clinician  Risk: prescription drug management    Meenu Reed MD  Family Medicine  [facility]      Follow Up  Return for Next scheduled follow up.    Patient Instructions   Back pain plan   - Symptomatic relief with heat, ice, Epsom salt soak, OTC Tylenol 1000 mg every 6 hours with food and water (max 4000 mg daily), massage, topical relief with voltaren gel or arnica cream  - amitriptyline 25mg nightly for pain, can increase dose if needed after a few weeks  - Consider acupuncture. Dr. Reed's acupuncture clinic is on Tuesdays, self pay, usually $.    [facility] is in network with some commercial insurances-not Medicare, Medicaid, or  sushma-and have evening and Saturday appointments available. [email or web address]   [facility] offers sliding scale pricing and has evening appointments a few days a week. [email or web address]  - Referral to pain management, you should receive a call within 1 week to schedule the appointment.  If you do not hear anything please let us know.    AK's  - 5-FU 2x daily for 4 weeks, may repeat       Patient or patient representative verbalized consent for the use of Ambient Listening during the visit with  Meenu Reed MD for chart documentation. 7/28/2025  16:27 EDT

## 2025-07-29 ENCOUNTER — TELEPHONE (OUTPATIENT)
Dept: SURGERY | Facility: CLINIC | Age: 50
End: 2025-07-29
Payer: COMMERCIAL

## 2025-07-29 NOTE — TELEPHONE ENCOUNTER
Attempted to call to schedule ed f/u for colitis this week  (7/30-7/31) and left voicemail.  Hub / Office OK to schedule

## 2025-07-31 ENCOUNTER — OFFICE VISIT (OUTPATIENT)
Dept: SURGERY | Facility: CLINIC | Age: 50
End: 2025-07-31
Payer: COMMERCIAL

## 2025-07-31 VITALS
DIASTOLIC BLOOD PRESSURE: 72 MMHG | OXYGEN SATURATION: 99 % | SYSTOLIC BLOOD PRESSURE: 114 MMHG | BODY MASS INDEX: 24.96 KG/M2 | HEIGHT: 67 IN | HEART RATE: 83 BPM | WEIGHT: 159 LBS

## 2025-07-31 DIAGNOSIS — K52.9 COLITIS: Primary | ICD-10-CM

## 2025-07-31 RX ORDER — ACYCLOVIR 800 MG/1
3600 TABLET ORAL 3 TIMES DAILY
COMMUNITY

## 2025-07-31 NOTE — PROGRESS NOTES
General Surgery H&P/Consultation    Impression/Plan:    Mr. Kareem Cruz is a 50 y.o. with colitis of unclear etiology.  I recommended obtaining repeat labs with a CMP and CBC.  Additionally we will obtain a GI PCR and C. difficile testing to evaluate for viral and bacterial pathogens.  Agree with continuing Augmentin for now.  I will call him with results of the laboratory tests and recommendations regarding next steps.  Timing of colonoscopy to be determined based on clinical progress.    Referring Provider: No ref. provider found    Chief Complaint:    Abdominal pain, diarrhea    History of Present Illness:    Mr. Kareem Cruz is a 50 y.o. gentleman presenting for evaluation of colitis with associated abdominal pain and diarrhea.  He was seen in the emergency room at [place] on 7/24/2025 after developing acute onset left lower quadrant abdominal pain and fever.  Findings on CT scan of the abdomen and pelvis there were consistent with nonspecific colitis.  He was discharged on Bentyl and Augmentin.  He reports he is still having numerous bowel movements per day but they have slowed down some from the time of presentation.  His fevers have resolved.  He has history of a colon resection in 2013 for diverticulitis.  Subsequently he estimates that he has had 7 laparoscopic hernia repairs that failed.  He ultimately underwent abdominal wall reconstruction, panniculectomy, hernia repair in 2023 in [facility].  He reports that he has had a colonoscopy since his colon resection in 2013, but is not sure exactly when it was.    Past Medical History:   Past Medical History:   Diagnosis Date    AIDS 2004    Alcohol abuse 09/2013    Quit 2022    Alcoholism 09/2013    Anxiety 1988    Escitalopram    Arthritis 2004    Chronic pain disorder 1998    Concentrated in spine and extremities    Colitis     Depression 1987    Diverticulosis 2013    Colon resection    Erectile dysfunction 2004    GERD (gastroesophageal reflux disease)      Head injury 02/21/21    Alcohol related fall    HIV (human immunodeficiency virus infection) 1992    HL (hearing loss) 2001    Low back pain 2021    Obesity 1991    PTSD (post-traumatic stress disorder) 02/01/1993    Seizures March 2025    Not confirmed medically    Skin cancer     Substance abuse 2019    Naltrexone (alcohol)    Suicide attempt 02/11/1993    Overdose of benzodiazepines    Syphilis     Syphilis 08/13/2024    Visual impairment Prism    Head inhjury in Jan 2921    Whooping cough 08/2015          Past Surgical History:    Past Surgical History:   Procedure Laterality Date    ABDOMINAL HERNIA REPAIR  2020    ABDOMINAL WALL SURGERY Bilateral 10/30/2022    Reconstructive surgery    ANKLE SURGERY Right 09/09/2009    BARIATRIC SURGERY  11/2009    COLON SURGERY Left 12/24/2014    Partial    COLONOSCOPY  11/2022    COLONOSCOPY  2016    COSMETIC SURGERY  10/30/2023    Abdominal wall, reconstruction, panniculectomy, and hernia repair    FINGER FUSION Right 02/10/2025    Procedure: RIGHT RING PROXIMAL INTERPHALANGEAL FUSION, RIGHT RING DISTAL INTERPHALANGEAL FUSION;  Surgeon: Danii Bernabe MD;  Location: [facility] MA[facility] OR;  Service: Orthopedics;  Laterality: Right;    FRACTURE SURGERY  12/04/2024    Right third finger - dog attack    GASTRECTOMY  3/16/2016    VSG 3/16/2016    HERNIA REPAIR  11/2015    HERNIA REPAIR  2016    HERNIA REPAIR  2017    HERNIA REPAIR  2019    HERNIA REPAIR Bilateral 10/30/2022    LAPAROSCOPIC GASTRIC BANDING  2009    PANNICULECTOMY Bilateral 10/30/2022    SEPTOPLASTY  06/2016    SINUS SURGERY  06/2016    SKIN BIOPSY  2919    SPINE SURGERY  3/2020    Rhizotomy         Family History:    Family History   Problem Relation Age of Onset    Alcohol abuse Mother     Anxiety disorder Mother     Arthritis Mother     COPD Mother     Heart disease Mother     Hyperlipidemia Mother     Stroke Mother     Thyroid disease Mother     Seizures Mother     Alcohol abuse Father     Arthritis Father     Heart  disease Father     Stroke Father     Anxiety disorder Father     Birth defects Sister         Everyone in my generation has some Sort of birth defect    Depression Sister     Depression Brother     ADD / ADHD Brother     Schizophrenia Brother     Self-Injurious Behavior  Brother     Suicide Attempts Brother     Drug abuse Brother     Hearing loss Brother     Mental illness Brother     Mental illness Brother     Vision loss Paternal Uncle         Usher syndrome    Birth defects Paternal Uncle         Usher Syndrome    COPD Maternal Grandmother     Colon cancer Other          Social History:    Social History     Socioeconomic History    Marital status:    Tobacco Use    Smoking status: Former     Current packs/day: 0.00     Average packs/day: 1 pack/day for 23.0 years (23.0 ttl pk-yrs)     Types: Cigarettes     Start date: 1/1/1992     Quit date: 1/1/2015     Years since quitting: 10.5    Smokeless tobacco: Never   Vaping Use    Vaping status: Never Used   Substance and Sexual Activity    Alcohol use: Not Currently    Drug use: Not Currently     Types: Amphetamines, Cocaine(coke), GHB, Ketamine, LSD, Marijuana, MDMA (ecstacy), Methamphetamines, Psilocybin    Sexual activity: Yes     Partners: Male     Birth control/protection: None, Partner of same sex         Allergies:   Allergies   Allergen Reactions    Gabapentin Mental Status Change and Hallucinations    Adhesive Tape Dermatitis       Medications:     Current Outpatient Medications:     acyclovir (ZOVIRAX) 800 MG tablet, Take 4.5 tablets by mouth 3 (Three) Times a Day., Disp: , Rfl:     aspirin 81 MG EC tablet, Take  by mouth Daily., Disp: , Rfl:     B Complex Vitamins (Vitamin B Complex) tablet, Take 1 tablet by mouth Daily., Disp: , Rfl:     Darunavir-Cobicistat (Prezcobix) 800-150 MG per tablet, Take 1 tablet by mouth Daily., Disp: , Rfl:     dicyclomine (BENTYL) 20 MG tablet, Take 1 tablet by mouth Every 6 (Six) Hours., Disp: , Rfl:     dolutegravir  (Tivicay) 50 MG tablet, Take 1 tablet by mouth Daily., Disp: , Rfl:     Doravirine (Pifeltro) 100 MG tablet, Take 100 mg by mouth Daily., Disp: , Rfl:     ferrous sulfate 325 (65 FE) MG tablet, Take 1 tablet by mouth Daily With Breakfast., Disp: , Rfl:     ibuprofen (ADVIL,MOTRIN) 600 MG tablet, Take 1 tablet by mouth Every 6 (Six) Hours As Needed for Mild Pain., Disp: 30 tablet, Rfl: 0    multivitamin with minerals tablet tablet, Take 1 tablet by mouth Daily., Disp: , Rfl:     mupirocin (BACTROBAN) 2 % nasal ointment, Administer 1 Application into the nostril(s) as directed by provider 2 (Two) Times a Day. Indications: Hair Follicle Inflammation, Disp: 30 g, Rfl: 1    sulfamethoxazole-trimethoprim (BACTRIM DS,SEPTRA DS) 800-160 MG per tablet, Take 1 tablet by mouth Daily., Disp: , Rfl:     Valbenazine Tosylate (Ingrezza) 40 MG capsule, Take  by mouth., Disp: , Rfl:     zinc sulfate (ZINCATE) 220 (50 Zn) MG capsule, Take  by mouth Daily., Disp: , Rfl:     amitriptyline (ELAVIL) 25 MG tablet, Take 1 tablet by mouth Every Night. Indications: Chronic Pain (Patient not taking: Reported on 7/31/2025), Disp: 30 tablet, Rfl: 1    fluorouracil (EFUDEX) 5 % cream, Apply 1 Application topically to the appropriate area as directed 2 (Two) Times a Day. Indications: Actinic Keratosis (Patient not taking: Reported on 7/31/2025), Disp: 40 g, Rfl: 0    Radiology/Endoscopy:    CT abdomen pelvis reviewed and there is thickening of the distal colon and the proximal rectum.  There is a end to end rectosigmoid colon anastomosis that appears patent.  There is no evidence of large bowel obstruction.    Labs:    CMP from 7/24/2025 reviewed showing sodium 132, potassium 4.1, chloride 98, CO2 25, BUN 11, creatinine 0.94, total bilirubin 4.0 (he reports history of Gilbert's syndrome); CBC with WBC 6, hemoglobin 13.5, hematocrit 38.4, platelets 165    Vitals:    07/31/25 1012   BP: 114/72   Pulse: 83   SpO2: 99%     Body mass index is 24.9  "kg/m².  170.2 cm (67.01\")  72.1 kg (159 lb)      Physical Exam:   Constitutional: Well-developed, well-nourished, no acute distress   Gastrointestinal: Soft, well-healed midline incision from abdominal wall reconstruction, tender to palpation in the left lower quadrant          Matt Cook MD  General and Endoscopic Surgery  [facility]    [address], [address]  [place], [address]  P: [phone]  F: [phone]     "

## 2025-08-06 ENCOUNTER — TELEPHONE (OUTPATIENT)
Dept: PAIN MEDICINE | Facility: CLINIC | Age: 50
End: 2025-08-06
Payer: COMMERCIAL

## 2025-08-07 ENCOUNTER — OFFICE VISIT (OUTPATIENT)
Dept: PAIN MEDICINE | Facility: CLINIC | Age: 50
End: 2025-08-07
Payer: COMMERCIAL

## 2025-08-07 ENCOUNTER — PREP FOR SURGERY (OUTPATIENT)
Dept: SURGERY | Facility: SURGERY CENTER | Age: 50
End: 2025-08-07
Payer: COMMERCIAL

## 2025-08-07 VITALS
HEIGHT: 67 IN | TEMPERATURE: 96.9 F | RESPIRATION RATE: 12 BRPM | DIASTOLIC BLOOD PRESSURE: 78 MMHG | BODY MASS INDEX: 25.49 KG/M2 | HEART RATE: 95 BPM | SYSTOLIC BLOOD PRESSURE: 121 MMHG | WEIGHT: 162.4 LBS | OXYGEN SATURATION: 99 %

## 2025-08-07 DIAGNOSIS — M46.1 SACROILIITIS: Primary | ICD-10-CM

## 2025-08-07 RX ORDER — DIAZEPAM 5 MG/1
10 TABLET ORAL ONCE
OUTPATIENT
Start: 2025-08-07

## 2025-08-08 ENCOUNTER — TRANSCRIBE ORDERS (OUTPATIENT)
Dept: SURGERY | Facility: SURGERY CENTER | Age: 50
End: 2025-08-08
Payer: COMMERCIAL

## 2025-08-08 ENCOUNTER — PATIENT ROUNDING (BHMG ONLY) (OUTPATIENT)
Dept: PAIN MEDICINE | Facility: CLINIC | Age: 50
End: 2025-08-08
Payer: COMMERCIAL

## 2025-08-08 DIAGNOSIS — Z41.9 SURGERY, ELECTIVE: Primary | ICD-10-CM

## 2025-08-22 ENCOUNTER — TELEPHONE (OUTPATIENT)
Dept: SURGERY | Facility: CLINIC | Age: 50
End: 2025-08-22
Payer: COMMERCIAL

## 2025-08-28 ENCOUNTER — HOSPITAL ENCOUNTER (OUTPATIENT)
Facility: SURGERY CENTER | Age: 50
Setting detail: HOSPITAL OUTPATIENT SURGERY
Discharge: HOME | End: 2025-08-28
Attending: ANESTHESIOLOGY | Admitting: ANESTHESIOLOGY
Payer: COMMERCIAL

## 2025-08-28 ENCOUNTER — HOSPITAL ENCOUNTER (OUTPATIENT)
Dept: GENERAL RADIOLOGY | Facility: SURGERY CENTER | Age: 50
End: 2025-08-28
Payer: COMMERCIAL

## 2025-08-28 VITALS
DIASTOLIC BLOOD PRESSURE: 81 MMHG | HEIGHT: 67 IN | OXYGEN SATURATION: 100 % | RESPIRATION RATE: 16 BRPM | SYSTOLIC BLOOD PRESSURE: 121 MMHG | TEMPERATURE: 98 F | WEIGHT: 150 LBS | BODY MASS INDEX: 23.54 KG/M2 | HEART RATE: 60 BPM

## 2025-08-28 DIAGNOSIS — M46.1 SACROILIITIS: ICD-10-CM

## 2025-08-28 DIAGNOSIS — Z41.9 SURGERY, ELECTIVE: ICD-10-CM

## 2025-08-28 PROCEDURE — 25510000001 IOPAMIDOL 61 % SOLUTION 30 ML VIAL: Performed by: ANESTHESIOLOGY

## 2025-08-28 PROCEDURE — 25010000002 BUPIVACAINE (PF) 0.5 % SOLUTION 10 ML VIAL: Performed by: ANESTHESIOLOGY

## 2025-08-28 PROCEDURE — 25010000002 METHYLPREDNISOLONE PER 80 MG: Performed by: ANESTHESIOLOGY

## 2025-08-28 PROCEDURE — 77002 NEEDLE LOCALIZATION BY XRAY: CPT

## 2025-08-28 PROCEDURE — 25010000002 LIDOCAINE PF 1% 1 % SOLUTION 5 ML VIAL: Performed by: ANESTHESIOLOGY

## 2025-08-28 RX ORDER — DIAZEPAM 5 MG/1
10 TABLET ORAL ONCE
Status: COMPLETED | OUTPATIENT
Start: 2025-08-28 | End: 2025-08-28

## 2025-08-28 RX ADMIN — DIAZEPAM 10 MG: 5 TABLET ORAL at 14:48

## (undated) DEVICE — NDL SPINE 25G 31/2IN BLU

## (undated) DEVICE — EPIDURAL TRAY: Brand: MEDLINE INDUSTRIES, INC.

## (undated) DEVICE — GLV SURG TRIUMPH PF LTX 7.5 STRL